# Patient Record
Sex: FEMALE | Race: WHITE | NOT HISPANIC OR LATINO | URBAN - METROPOLITAN AREA
[De-identification: names, ages, dates, MRNs, and addresses within clinical notes are randomized per-mention and may not be internally consistent; named-entity substitution may affect disease eponyms.]

---

## 2017-07-03 ENCOUNTER — OUTPATIENT (OUTPATIENT)
Dept: OUTPATIENT SERVICES | Facility: HOSPITAL | Age: 62
LOS: 1 days | Discharge: HOME | End: 2017-07-03

## 2017-07-03 DIAGNOSIS — Z01.818 ENCOUNTER FOR OTHER PREPROCEDURAL EXAMINATION: ICD-10-CM

## 2017-07-07 ENCOUNTER — OUTPATIENT (OUTPATIENT)
Dept: OUTPATIENT SERVICES | Facility: HOSPITAL | Age: 62
LOS: 1 days | Discharge: HOME | End: 2017-07-07

## 2017-07-07 DIAGNOSIS — M20.41 OTHER HAMMER TOE(S) (ACQUIRED), RIGHT FOOT: ICD-10-CM

## 2018-06-22 ENCOUNTER — EMERGENCY (EMERGENCY)
Facility: HOSPITAL | Age: 63
LOS: 0 days | Discharge: HOME | End: 2018-06-22
Attending: EMERGENCY MEDICINE | Admitting: EMERGENCY MEDICINE

## 2018-06-22 VITALS
HEART RATE: 72 BPM | TEMPERATURE: 97 F | RESPIRATION RATE: 19 BRPM | OXYGEN SATURATION: 99 % | DIASTOLIC BLOOD PRESSURE: 128 MMHG | SYSTOLIC BLOOD PRESSURE: 179 MMHG

## 2018-06-22 DIAGNOSIS — S92.901A UNSPECIFIED FRACTURE OF RIGHT FOOT, INITIAL ENCOUNTER FOR CLOSED FRACTURE: ICD-10-CM

## 2018-06-22 DIAGNOSIS — M79.671 PAIN IN RIGHT FOOT: ICD-10-CM
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
## 2018-06-22 NOTE — ED PROCEDURE NOTE - CPROC ED POST PROC CARE GUIDE1
Verbal/written post procedure instructions were given to patient/caregiver./Instructed patient/caregiver regarding signs and symptoms of infection./Instructed patient/caregiver to follow-up with primary care physician./Elevate the injured extremity as instructed.

## 2021-05-11 ENCOUNTER — APPOINTMENT (OUTPATIENT)
Dept: NEUROLOGY | Facility: CLINIC | Age: 66
End: 2021-05-11
Payer: COMMERCIAL

## 2021-05-11 VITALS
WEIGHT: 180 LBS | HEIGHT: 65 IN | SYSTOLIC BLOOD PRESSURE: 121 MMHG | BODY MASS INDEX: 29.99 KG/M2 | TEMPERATURE: 97.9 F | OXYGEN SATURATION: 97 % | HEART RATE: 54 BPM | DIASTOLIC BLOOD PRESSURE: 81 MMHG

## 2021-05-11 DIAGNOSIS — Z83.3 FAMILY HISTORY OF DIABETES MELLITUS: ICD-10-CM

## 2021-05-11 DIAGNOSIS — Z78.9 OTHER SPECIFIED HEALTH STATUS: ICD-10-CM

## 2021-05-11 DIAGNOSIS — D50.8 OTHER IRON DEFICIENCY ANEMIAS: ICD-10-CM

## 2021-05-11 DIAGNOSIS — R20.8 OTHER DISTURBANCES OF SKIN SENSATION: ICD-10-CM

## 2021-05-11 DIAGNOSIS — Z87.891 PERSONAL HISTORY OF NICOTINE DEPENDENCE: ICD-10-CM

## 2021-05-11 DIAGNOSIS — E03.9 HYPOTHYROIDISM, UNSPECIFIED: ICD-10-CM

## 2021-05-11 PROCEDURE — 99204 OFFICE O/P NEW MOD 45 MIN: CPT

## 2021-05-11 PROCEDURE — 99072 ADDL SUPL MATRL&STAF TM PHE: CPT

## 2021-05-11 RX ORDER — CHROMIUM 200 MCG
TABLET ORAL
Refills: 0 | Status: ACTIVE | COMMUNITY

## 2021-05-11 RX ORDER — ESOMEPRAZOLE MAGNESIUM 2.5 MG/1
2.5 GRANULE, DELAYED RELEASE ORAL
Refills: 0 | Status: ACTIVE | COMMUNITY

## 2021-05-11 RX ORDER — LEVOTHYROXINE SODIUM 125 UG/1
125 TABLET ORAL DAILY
Refills: 0 | Status: ACTIVE | COMMUNITY

## 2021-05-11 NOTE — ASSESSMENT
[FreeTextEntry1] : 1.  Left sciatica and abnormal lumbar spine MRI showing foraminal disease at L5-S1.   \par 2.  Bilateral vibration sensory loss in feet.\par \par Plan:\par 1.  EMG/NCS bilateral lower extremities.\par 2.  Neuropathy labs.\par 3.  No medications needed at this time.\par 4.  Return in 6 months.

## 2021-05-11 NOTE — REVIEW OF SYSTEMS
[Numbness] : numbness [Tingling] : tingling [Lightheadedness] : lightheadedness [Sleep Disturbances] : sleep disturbances [Heartburn] : heartburn [Joint Pain] : joint pain [Easy Bruising] : a tendency for easy bruising [Fever] : no fever [Chills] : no chills [Feeling Poorly] : not feeling poorly [Feeling Tired] : not feeling tired [Recent Weight Gain (___ Lbs)] : no recent weight gain [Recent Weight Loss (___ Lbs)] : no recent weight loss [Confused or Disoriented] : no confusion [Memory Lapses or Loss] : no memory loss [Decr. Concentrating Ability] : no decrease in concentrating ability [Facial Weakness] : no facial weakness [Arm Weakness] : no arm weakness [Hand Weakness] : no hand weakness [Leg Weakness] : no leg weakness [Seizures] : no convulsions [Dizziness] : no dizziness [Fainting] : no fainting [Vertigo] : no vertigo [Cluster Headache] : no cluster headache [Migraine Headache] : no migraine headache [Tension Headache] : no tension-type headache [Difficulty Walking] : no difficulty walking [Inability to Walk] : able to walk [Ataxia] : no ataxia [Anxiety] : no anxiety [Depression] : no depression [Eye Pain] : no eye pain [Eyesight Problems] : no eyesight problems [Earache] : no earache [Loss Of Hearing] : no hearing loss [Heart Rate Is Slow] : the heart rate was not slow [Heart Rate Is Fast] : the heart rate was not fast [Chest Pain] : no chest pain [Palpitations] : no palpitations [Shortness Of Breath] : no shortness of breath [Wheezing] : no wheezing [Cough] : no cough [Abdominal Pain] : no abdominal pain [Vomiting] : no vomiting [Constipation] : no constipation [Diarrhea] : no diarrhea [Melena] : no melena [Dysuria] : no dysuria [Incontinence] : no incontinence [Joint Swelling] : no joint swelling [Joint Stiffness] : no joint stiffness [Skin Lesions] : no skin lesions [Skin Wound] : no skin wound [Hot Flashes] : no hot flashes [Muscle Weakness] : no muscle weakness [Easy Bleeding] : no tendency for easy bleeding

## 2021-05-11 NOTE — PHYSICAL EXAM
[FreeTextEntry1] : Recent and remote memory, general fund of knowledge, attention, concentration, and language function were intact.  Pupils are equal, round and reactive to light and accommodation.  Funduscopic exam did not show any papilledema.  Visual fields are intact to confrontation.  Extraocular movements are full.  There is no nystagmus.  The facial muscles are symmetric.  Facial sensation is intact to light touch, temperature, and pinprick.  Hearing is intact to finger rub bilaterally.  Tongue is midline.  Palate elevates symmetrically.  Neck is supple.  There is no meningismus.  Shoulder shrugs are symmetric.  Motor exam demonstrates 5/5 strength in the proximal and distal muscles of the upper and lower extremities.  Tone and bulk were normal.  There is no pronator drift.  Reflexes are 2+ bilaterally in the biceps, triceps, brachioradialis, patellae and ankles.  Toes are downgoing.  There is no clonus.  Coordination demonstrates normal finger-to-nose, heel-to-shin and rapid alternating movements.  Gait demonstrates normal heel and toe walk.  Tandem gait is normal.  Sensory exam, normal light touch, pinprick, vibration sensation in upper extremities, decreased LT PP plantar aspect of left foot.  Also vibration 5 seconds in toes bilaterally.\par \par The patient is well-developed, well-nourished female in no acute distress.  Cardiac exam demonstrates a regular rate and rhythm.  No murmurs.  Carotids are 2+ bilaterally.  No bruits.  Abdomen is soft, nontender, and nondistended.  Bowel sounds are present.  Extremities showed no clubbing, cyanosis or edema. \par \par \par

## 2021-05-11 NOTE — HISTORY OF PRESENT ILLNESS
[FreeTextEntry1] : Pt is 65 yof c/o numbness in her legs.  Went to laser surgery for tiny veins in her legs. A week later experienced sharp pain behind her left knee then a month later had numbness and tingling in her left foot especially when she lays down.  Saw another neurologist who ran MRI of her lumbar spine.  Sx's present now for over a year, tingling for 6-7 months.  Doesn't affect her balance.  Mostly bothers her when laying down.  No buckling of her legs.  If props feet up can feel the numbness.   No sx's in right leg.  Did not have EMG/NCS.

## 2021-07-07 ENCOUNTER — APPOINTMENT (OUTPATIENT)
Dept: NEUROLOGY | Facility: CLINIC | Age: 66
End: 2021-07-07
Payer: COMMERCIAL

## 2021-07-07 DIAGNOSIS — M54.2 CERVICALGIA: ICD-10-CM

## 2021-07-07 PROCEDURE — 95910 NRV CNDJ TEST 7-8 STUDIES: CPT

## 2021-07-07 PROCEDURE — 95886 MUSC TEST DONE W/N TEST COMP: CPT

## 2021-07-07 PROCEDURE — 99072 ADDL SUPL MATRL&STAF TM PHE: CPT

## 2021-10-26 ENCOUNTER — TRANSCRIPTION ENCOUNTER (OUTPATIENT)
Age: 66
End: 2021-10-26

## 2022-01-27 ENCOUNTER — APPOINTMENT (OUTPATIENT)
Dept: NEUROLOGY | Facility: CLINIC | Age: 67
End: 2022-01-27

## 2022-07-24 ENCOUNTER — LABORATORY RESULT (OUTPATIENT)
Age: 67
End: 2022-07-24

## 2022-07-25 ENCOUNTER — APPOINTMENT (OUTPATIENT)
Dept: UROLOGY | Facility: CLINIC | Age: 67
End: 2022-07-25

## 2022-07-25 VITALS — WEIGHT: 190 LBS | BODY MASS INDEX: 34.96 KG/M2 | HEIGHT: 62 IN

## 2022-07-25 DIAGNOSIS — G57.93 UNSPECIFIED MONONEUROPATHY OF BILATERAL LOWER LIMBS: ICD-10-CM

## 2022-07-25 LAB
BILIRUB UR QL STRIP: NORMAL
COLLECTION METHOD: NORMAL
GLUCOSE UR-MCNC: NORMAL
HCG UR QL: 0.2 EU/DL
HGB UR QL STRIP.AUTO: NORMAL
KETONES UR-MCNC: NORMAL
LEUKOCYTE ESTERASE UR QL STRIP: NORMAL
NITRITE UR QL STRIP: NORMAL
PH UR STRIP: 5.5
PROT UR STRIP-MCNC: NORMAL
SP GR UR STRIP: 1.02

## 2022-07-25 PROCEDURE — 99204 OFFICE O/P NEW MOD 45 MIN: CPT

## 2022-07-25 PROCEDURE — 81002 URINALYSIS NONAUTO W/O SCOPE: CPT

## 2022-07-25 RX ORDER — ESCITALOPRAM OXALATE 10 MG/1
10 TABLET ORAL
Qty: 90 | Refills: 0 | Status: ACTIVE | COMMUNITY
Start: 2021-11-11

## 2022-07-25 RX ORDER — NITROFURANTOIN (MONOHYDRATE/MACROCRYSTALS) 25; 75 MG/1; MG/1
100 CAPSULE ORAL
Qty: 14 | Refills: 0 | Status: ACTIVE | COMMUNITY
Start: 2022-07-05

## 2022-07-25 RX ORDER — DENOSUMAB 60 MG/ML
60 INJECTION SUBCUTANEOUS
Qty: 1 | Refills: 0 | Status: ACTIVE | COMMUNITY
Start: 2022-06-15

## 2022-07-25 RX ORDER — ERGOCALCIFEROL 1.25 MG/1
1.25 MG CAPSULE, LIQUID FILLED ORAL
Qty: 12 | Refills: 0 | Status: ACTIVE | COMMUNITY
Start: 2022-04-23

## 2022-07-25 NOTE — ASSESSMENT
[FreeTextEntry1] : Ms. Gasca is a 66 year old female with a possible left renal stone seen on KUB. We will have her get a CT scan without contrast to verify this is a stone and follow up with her after this. We discussed that if the CT scan does not show a stone, then she will need to be set up for a cystoscopy to rule out any tumors or stones in the bladder. All of her questions were otherwise answered. \par \par Patient was seen and examined with nurse practitioner Tara Stuart who also acted as a scribe for this report.  I agree with the note above.  A long discussion with the patient about proceeding with a CT first to see if there is a large stone present or not.  If there is no stones and certainly she needs a cystoscopy to complete the microhematuria work-up.  If there is a stone then we will decide on further intervention for the stone.  All her questions were appropriately answered.  While she has some flank discomfort she does not have true renal colic and thus it is difficult to know because she also has some back issues etc.

## 2022-07-25 NOTE — PHYSICAL EXAM
[Normal Appearance] : normal appearance [General Appearance - In No Acute Distress] : no acute distress [Edema] : no peripheral edema [] : no respiratory distress [Abdomen Soft] : soft [Abdomen Tenderness] : non-tender [Normal Station and Gait] : the gait and station were normal for the patient's age [Oriented To Time, Place, And Person] : oriented to person, place, and time [Affect] : the affect was normal

## 2022-07-25 NOTE — HISTORY OF PRESENT ILLNESS
[Currently Experiencing ___] :  [unfilled] [Hematuria - Microscopic] : microscopic hematuria [FreeTextEntry1] : Ms. Gasca is a 66 year old female with a history of gastric sleeve done 25 years ago and kidney stones in the past who presents for microhematuria found by her GYN. She did have an episode of left flank pain and urinary frequency and urgency that has since resolved. Pt had a KUB done 7/22 that shows a 2.8x1.4cm ovoid calcific density suspecting a renal calculus. Pt currently denies any flank pain, gross hematuria, urinary frequency, urgency. \par \par

## 2022-08-22 ENCOUNTER — APPOINTMENT (OUTPATIENT)
Dept: UROLOGY | Facility: CLINIC | Age: 67
End: 2022-08-22

## 2022-08-22 VITALS
WEIGHT: 187 LBS | SYSTOLIC BLOOD PRESSURE: 129 MMHG | HEIGHT: 62 IN | DIASTOLIC BLOOD PRESSURE: 82 MMHG | HEART RATE: 46 BPM | BODY MASS INDEX: 34.41 KG/M2

## 2022-08-22 PROCEDURE — 99214 OFFICE O/P EST MOD 30 MIN: CPT

## 2022-08-22 NOTE — PHYSICAL EXAM
[Normal Appearance] : normal appearance [General Appearance - In No Acute Distress] : no acute distress [Oriented To Time, Place, And Person] : oriented to person, place, and time [Affect] : the affect was normal

## 2022-08-22 NOTE — ASSESSMENT
[FreeTextEntry1] : 67 y/o female with left partial staghorn calculus. We discussed options in detail including left PCNL. I suggested that this would be the best overall procedure to get rid of the stone in one sitting, but she has had many ureteroscopies and would prefer that with laser lithotripsy rather than proceeding with PCNL. We have discussed the pros and cons and all her questions have been answered. As she prefers several procedures that is not PCNL, we will start with laser lithotripsy and ureteroscopy and go from there with sandwich therapy.  \par tOTAL TIME SPENT DISCUSSING THE VARIOUS PROCEDURES, RISKS AND BENEFITS WAS 30 MIN.

## 2022-08-22 NOTE — HISTORY OF PRESENT ILLNESS
[FreeTextEntry1] : 67 y/o female present a history of stones and hematuria. She had a CT which confirmed that she has a branched calculus which was 2.4cm by 1.3cm by 1.2cm in size. This is on the left side and I have reviewed the CT in its entirety. There is no hydronephrosis or hydroureter. The patient's previous stones have been calcium oxalate. We went over the CT and have discussed this. She currently has no pain or discomfort. \par \par allergies: none known.

## 2022-09-13 ENCOUNTER — OUTPATIENT (OUTPATIENT)
Dept: OUTPATIENT SERVICES | Facility: HOSPITAL | Age: 67
LOS: 1 days | Discharge: HOME | End: 2022-09-13

## 2022-09-13 ENCOUNTER — RESULT REVIEW (OUTPATIENT)
Age: 67
End: 2022-09-13

## 2022-09-13 VITALS
DIASTOLIC BLOOD PRESSURE: 70 MMHG | TEMPERATURE: 97 F | HEART RATE: 58 BPM | OXYGEN SATURATION: 96 % | WEIGHT: 188.27 LBS | HEIGHT: 63 IN | SYSTOLIC BLOOD PRESSURE: 151 MMHG | RESPIRATION RATE: 18 BRPM

## 2022-09-13 DIAGNOSIS — Z98.84 BARIATRIC SURGERY STATUS: Chronic | ICD-10-CM

## 2022-09-13 DIAGNOSIS — Z01.818 ENCOUNTER FOR OTHER PREPROCEDURAL EXAMINATION: ICD-10-CM

## 2022-09-13 DIAGNOSIS — N20.2 CALCULUS OF KIDNEY WITH CALCULUS OF URETER: ICD-10-CM

## 2022-09-13 DIAGNOSIS — E89.0 POSTPROCEDURAL HYPOTHYROIDISM: Chronic | ICD-10-CM

## 2022-09-13 LAB
ALBUMIN SERPL ELPH-MCNC: 4.6 G/DL — SIGNIFICANT CHANGE UP (ref 3.5–5.2)
ALP SERPL-CCNC: 69 U/L — SIGNIFICANT CHANGE UP (ref 30–115)
ALT FLD-CCNC: 25 U/L — SIGNIFICANT CHANGE UP (ref 0–41)
ANION GAP SERPL CALC-SCNC: 11 MMOL/L — SIGNIFICANT CHANGE UP (ref 7–14)
APTT BLD: 26.5 SEC — LOW (ref 27–39.2)
AST SERPL-CCNC: 25 U/L — SIGNIFICANT CHANGE UP (ref 0–41)
BASOPHILS # BLD AUTO: 0.02 K/UL — SIGNIFICANT CHANGE UP (ref 0–0.2)
BASOPHILS NFR BLD AUTO: 0.4 % — SIGNIFICANT CHANGE UP (ref 0–1)
BILIRUB SERPL-MCNC: 0.4 MG/DL — SIGNIFICANT CHANGE UP (ref 0.2–1.2)
BUN SERPL-MCNC: 20 MG/DL — SIGNIFICANT CHANGE UP (ref 10–20)
CALCIUM SERPL-MCNC: 9.6 MG/DL — SIGNIFICANT CHANGE UP (ref 8.4–10.5)
CHLORIDE SERPL-SCNC: 103 MMOL/L — SIGNIFICANT CHANGE UP (ref 98–110)
CO2 SERPL-SCNC: 24 MMOL/L — SIGNIFICANT CHANGE UP (ref 17–32)
CREAT SERPL-MCNC: 0.7 MG/DL — SIGNIFICANT CHANGE UP (ref 0.7–1.5)
EGFR: 96 ML/MIN/1.73M2 — SIGNIFICANT CHANGE UP
EOSINOPHIL # BLD AUTO: 0.11 K/UL — SIGNIFICANT CHANGE UP (ref 0–0.7)
EOSINOPHIL NFR BLD AUTO: 2.1 % — SIGNIFICANT CHANGE UP (ref 0–8)
GLUCOSE SERPL-MCNC: 80 MG/DL — SIGNIFICANT CHANGE UP (ref 70–99)
HCT VFR BLD CALC: 41.3 % — SIGNIFICANT CHANGE UP (ref 37–47)
HGB BLD-MCNC: 13 G/DL — SIGNIFICANT CHANGE UP (ref 12–16)
IMM GRANULOCYTES NFR BLD AUTO: 0.2 % — SIGNIFICANT CHANGE UP (ref 0.1–0.3)
INR BLD: 0.92 RATIO — SIGNIFICANT CHANGE UP (ref 0.65–1.3)
LYMPHOCYTES # BLD AUTO: 1.79 K/UL — SIGNIFICANT CHANGE UP (ref 1.2–3.4)
LYMPHOCYTES # BLD AUTO: 34.6 % — SIGNIFICANT CHANGE UP (ref 20.5–51.1)
MCHC RBC-ENTMCNC: 29.5 PG — SIGNIFICANT CHANGE UP (ref 27–31)
MCHC RBC-ENTMCNC: 31.5 G/DL — LOW (ref 32–37)
MCV RBC AUTO: 93.9 FL — SIGNIFICANT CHANGE UP (ref 81–99)
MONOCYTES # BLD AUTO: 0.41 K/UL — SIGNIFICANT CHANGE UP (ref 0.1–0.6)
MONOCYTES NFR BLD AUTO: 7.9 % — SIGNIFICANT CHANGE UP (ref 1.7–9.3)
NEUTROPHILS # BLD AUTO: 2.84 K/UL — SIGNIFICANT CHANGE UP (ref 1.4–6.5)
NEUTROPHILS NFR BLD AUTO: 54.8 % — SIGNIFICANT CHANGE UP (ref 42.2–75.2)
NRBC # BLD: 0 /100 WBCS — SIGNIFICANT CHANGE UP (ref 0–0)
PLATELET # BLD AUTO: 247 K/UL — SIGNIFICANT CHANGE UP (ref 130–400)
POTASSIUM SERPL-MCNC: 4.5 MMOL/L — SIGNIFICANT CHANGE UP (ref 3.5–5)
POTASSIUM SERPL-SCNC: 4.5 MMOL/L — SIGNIFICANT CHANGE UP (ref 3.5–5)
PROT SERPL-MCNC: 7.3 G/DL — SIGNIFICANT CHANGE UP (ref 6–8)
PROTHROM AB SERPL-ACNC: 10.6 SEC — SIGNIFICANT CHANGE UP (ref 9.95–12.87)
RBC # BLD: 4.4 M/UL — SIGNIFICANT CHANGE UP (ref 4.2–5.4)
RBC # FLD: 13.3 % — SIGNIFICANT CHANGE UP (ref 11.5–14.5)
SODIUM SERPL-SCNC: 138 MMOL/L — SIGNIFICANT CHANGE UP (ref 135–146)
WBC # BLD: 5.18 K/UL — SIGNIFICANT CHANGE UP (ref 4.8–10.8)
WBC # FLD AUTO: 5.18 K/UL — SIGNIFICANT CHANGE UP (ref 4.8–10.8)

## 2022-09-13 PROCEDURE — 93010 ELECTROCARDIOGRAM REPORT: CPT

## 2022-09-13 PROCEDURE — 71046 X-RAY EXAM CHEST 2 VIEWS: CPT | Mod: 26

## 2022-09-13 NOTE — H&P PST ADULT - NSICDXPASTMEDICALHX_GEN_ALL_CORE_FT
PAST MEDICAL HISTORY:  Anemia last iron infusion 8/2022    Former smoker     GERD (gastroesophageal reflux disease)

## 2022-09-13 NOTE — H&P PST ADULT - AIRWAY
Mildly to Moderately Impaired: difficulty hearing in some environments or speaker may need to increase volume or speak distinctly
normal

## 2022-09-13 NOTE — H&P PST ADULT - REASON FOR ADMISSION
67 Y/O F SCHEDULED FOR PAST FOR  CYSTOSCOPY LEFT URETEROSCOPY LASER LITHOTRIPSY URETERAL STENT PLACEMENT UNDER GA WITH DR GUAJARDO ON 9/28/22. PT REPORTS SHE EXPERIENCED HEMATURIA AND ABDOMINAL PAIN IN AUGUST. WORKUP REVEALED LEFT KIDNEY STONE.

## 2022-09-13 NOTE — H&P PST ADULT - HISTORY OF PRESENT ILLNESS
CURRENTLY  DENIES ANY CP, SOB, PALPITATIONS, COUGH OR DYSURIA  EXERCISE TOLERANCE 2 FOS WITHOUT SOB    AS PER PATIENT  this is his/her complete medical history including medications - PRESCRIPTIONS  OVER THE COUNTER MEDS    pt denies any covid s/s, or tested positive in the past.  Received covid vaccine  pt advised self quarantine till day of procedure    Anesthesia Alert  NO--Difficult Airway  YES--History of neck surgery or radiation- PARTIAL THYROIDECTOMY  NO--Limited ROM of neck  NO--History of Malignant hyperthermia  NO--No personal or family history of Pseudocholinesterase deficiency.  NO--Prior Anesthesia Complication  NO--Latex Allergy  NO--Loose teeth  NO--History of Rheumatoid Arthritis  NO--ALBARO  NO--Bleeding risk  NO--Other_____    Patient verbalized understanding of instructions and was given the opportunity to ask questions and have them answered.

## 2022-09-14 LAB
APPEARANCE UR: CLEAR — SIGNIFICANT CHANGE UP
BACTERIA # UR AUTO: NEGATIVE — SIGNIFICANT CHANGE UP
BILIRUB UR-MCNC: NEGATIVE — SIGNIFICANT CHANGE UP
COLOR SPEC: SIGNIFICANT CHANGE UP
DIFF PNL FLD: ABNORMAL
EPI CELLS # UR: 1 /HPF — SIGNIFICANT CHANGE UP (ref 0–5)
GLUCOSE UR QL: SIGNIFICANT CHANGE UP
HYALINE CASTS # UR AUTO: 0 /LPF — SIGNIFICANT CHANGE UP (ref 0–7)
KETONES UR-MCNC: NEGATIVE — SIGNIFICANT CHANGE UP
LEUKOCYTE ESTERASE UR-ACNC: ABNORMAL
NITRITE UR-MCNC: NEGATIVE — SIGNIFICANT CHANGE UP
PH UR: 6 — SIGNIFICANT CHANGE UP (ref 5–8)
PROT UR-MCNC: NEGATIVE — SIGNIFICANT CHANGE UP
RBC CASTS # UR COMP ASSIST: 2 /HPF — SIGNIFICANT CHANGE UP (ref 0–4)
SP GR SPEC: 1.02 — SIGNIFICANT CHANGE UP (ref 1.01–1.02)
UROBILINOGEN FLD QL: SIGNIFICANT CHANGE UP
WBC UR QL: 6 /HPF — HIGH (ref 0–5)

## 2022-09-15 ENCOUNTER — APPOINTMENT (OUTPATIENT)
Dept: UROLOGY | Facility: CLINIC | Age: 67
End: 2022-09-15

## 2022-09-15 LAB
CULTURE RESULTS: SIGNIFICANT CHANGE UP
SPECIMEN SOURCE: SIGNIFICANT CHANGE UP

## 2022-09-16 ENCOUNTER — NON-APPOINTMENT (OUTPATIENT)
Age: 67
End: 2022-09-16

## 2022-09-25 ENCOUNTER — LABORATORY RESULT (OUTPATIENT)
Age: 67
End: 2022-09-25

## 2022-09-27 ENCOUNTER — NON-APPOINTMENT (OUTPATIENT)
Age: 67
End: 2022-09-27

## 2022-09-28 ENCOUNTER — APPOINTMENT (OUTPATIENT)
Dept: UROLOGY | Facility: HOSPITAL | Age: 67
End: 2022-09-28

## 2022-09-28 ENCOUNTER — TRANSCRIPTION ENCOUNTER (OUTPATIENT)
Age: 67
End: 2022-09-28

## 2022-09-28 ENCOUNTER — OUTPATIENT (OUTPATIENT)
Dept: OUTPATIENT SERVICES | Facility: HOSPITAL | Age: 67
LOS: 1 days | Discharge: HOME | End: 2022-09-28

## 2022-09-28 VITALS
OXYGEN SATURATION: 98 % | TEMPERATURE: 97 F | SYSTOLIC BLOOD PRESSURE: 128 MMHG | HEIGHT: 63 IN | WEIGHT: 188.05 LBS | RESPIRATION RATE: 17 BRPM | DIASTOLIC BLOOD PRESSURE: 65 MMHG | HEART RATE: 64 BPM

## 2022-09-28 VITALS — SYSTOLIC BLOOD PRESSURE: 119 MMHG | HEART RATE: 55 BPM | RESPIRATION RATE: 15 BRPM | DIASTOLIC BLOOD PRESSURE: 61 MMHG

## 2022-09-28 DIAGNOSIS — E89.0 POSTPROCEDURAL HYPOTHYROIDISM: Chronic | ICD-10-CM

## 2022-09-28 DIAGNOSIS — Z98.84 BARIATRIC SURGERY STATUS: Chronic | ICD-10-CM

## 2022-09-28 PROCEDURE — 52356 CYSTO/URETERO W/LITHOTRIPSY: CPT | Mod: LT

## 2022-09-28 RX ORDER — KETOROLAC TROMETHAMINE 30 MG/ML
30 SYRINGE (ML) INJECTION ONCE
Refills: 0 | Status: DISCONTINUED | OUTPATIENT
Start: 2022-09-28 | End: 2022-09-28

## 2022-09-28 RX ORDER — SODIUM CHLORIDE 9 MG/ML
1000 INJECTION, SOLUTION INTRAVENOUS
Refills: 0 | Status: DISCONTINUED | OUTPATIENT
Start: 2022-09-28 | End: 2022-09-28

## 2022-09-28 RX ORDER — HYDROMORPHONE HYDROCHLORIDE 2 MG/ML
0.5 INJECTION INTRAMUSCULAR; INTRAVENOUS; SUBCUTANEOUS
Refills: 0 | Status: DISCONTINUED | OUTPATIENT
Start: 2022-09-28 | End: 2022-09-28

## 2022-09-28 RX ORDER — PHENAZOPYRIDINE HCL 100 MG
200 TABLET ORAL ONCE
Refills: 0 | Status: COMPLETED | OUTPATIENT
Start: 2022-09-28 | End: 2022-09-28

## 2022-09-28 RX ADMIN — HYDROMORPHONE HYDROCHLORIDE 0.5 MILLIGRAM(S): 2 INJECTION INTRAMUSCULAR; INTRAVENOUS; SUBCUTANEOUS at 10:32

## 2022-09-28 RX ADMIN — HYDROMORPHONE HYDROCHLORIDE 0.5 MILLIGRAM(S): 2 INJECTION INTRAMUSCULAR; INTRAVENOUS; SUBCUTANEOUS at 10:18

## 2022-09-28 RX ADMIN — Medication 30 MILLIGRAM(S): at 10:37

## 2022-09-28 RX ADMIN — Medication 30 MILLIGRAM(S): at 10:46

## 2022-09-28 RX ADMIN — Medication 200 MILLIGRAM(S): at 10:37

## 2022-09-28 RX ADMIN — HYDROMORPHONE HYDROCHLORIDE 0.5 MILLIGRAM(S): 2 INJECTION INTRAMUSCULAR; INTRAVENOUS; SUBCUTANEOUS at 09:29

## 2022-09-28 RX ADMIN — SODIUM CHLORIDE 75 MILLILITER(S): 9 INJECTION, SOLUTION INTRAVENOUS at 09:24

## 2022-09-28 RX ADMIN — HYDROMORPHONE HYDROCHLORIDE 0.5 MILLIGRAM(S): 2 INJECTION INTRAMUSCULAR; INTRAVENOUS; SUBCUTANEOUS at 09:42

## 2022-09-28 NOTE — ASU PATIENT PROFILE, ADULT - FALL HARM RISK - UNIVERSAL INTERVENTIONS
Bed in lowest position, wheels locked, appropriate side rails in place/Call bell, personal items and telephone in reach/Instruct patient to call for assistance before getting out of bed or chair/Non-slip footwear when patient is out of bed/[place] to call system/Physically safe environment - no spills, clutter or unnecessary equipment/Purposeful Proactive Rounding/Room/bathroom lighting operational, light cord in reach

## 2022-09-28 NOTE — ASU DISCHARGE PLAN (ADULT/PEDIATRIC) - FOLLOW UP APPOINTMENTS
[facility]:  Ambulatory Surgery [facility]

## 2022-09-28 NOTE — BRIEF OPERATIVE NOTE - NSICDXBRIEFPROCEDURE_GEN_ALL_CORE_FT
PROCEDURES:  Ureteroscopy with laser lithotripsy and stent placement 28-Sep-2022 08:57:56  Nikolas Lopez

## 2022-09-28 NOTE — ASU DISCHARGE PLAN (ADULT/PEDIATRIC) - NS MD DC FALL RISK RISK
For information on Fall & Injury Prevention, visit: [email or web address] OR  [email or web address] OR  https://www.cdc.gov/steadi/patient.html

## 2022-09-29 ENCOUNTER — NON-APPOINTMENT (OUTPATIENT)
Age: 67
End: 2022-09-29

## 2022-09-30 ENCOUNTER — NON-APPOINTMENT (OUTPATIENT)
Age: 67
End: 2022-09-30

## 2022-09-30 DIAGNOSIS — N20.0 CALCULUS OF KIDNEY: ICD-10-CM

## 2022-09-30 DIAGNOSIS — G47.33 OBSTRUCTIVE SLEEP APNEA (ADULT) (PEDIATRIC): ICD-10-CM

## 2022-10-04 ENCOUNTER — NON-APPOINTMENT (OUTPATIENT)
Age: 67
End: 2022-10-04

## 2022-10-04 PROBLEM — Z87.891 PERSONAL HISTORY OF NICOTINE DEPENDENCE: Chronic | Status: ACTIVE | Noted: 2022-09-13

## 2022-10-04 PROBLEM — K21.9 GASTRO-ESOPHAGEAL REFLUX DISEASE WITHOUT ESOPHAGITIS: Chronic | Status: ACTIVE | Noted: 2022-09-13

## 2022-10-04 PROBLEM — D64.9 ANEMIA, UNSPECIFIED: Chronic | Status: ACTIVE | Noted: 2022-09-13

## 2022-10-17 ENCOUNTER — LABORATORY RESULT (OUTPATIENT)
Age: 67
End: 2022-10-17

## 2022-10-17 RX ORDER — PHENAZOPYRIDINE HYDROCHLORIDE 100 MG/1
100 TABLET ORAL 3 TIMES DAILY
Qty: 9 | Refills: 0 | Status: ACTIVE | COMMUNITY
Start: 2022-10-17 | End: 1900-01-01

## 2022-10-18 ENCOUNTER — NON-APPOINTMENT (OUTPATIENT)
Age: 67
End: 2022-10-18

## 2022-10-18 ENCOUNTER — OUTPATIENT (OUTPATIENT)
Dept: OUTPATIENT SERVICES | Facility: HOSPITAL | Age: 67
LOS: 1 days | Discharge: HOME | End: 2022-10-18

## 2022-10-18 VITALS
HEIGHT: 61 IN | TEMPERATURE: 99 F | WEIGHT: 183.42 LBS | DIASTOLIC BLOOD PRESSURE: 62 MMHG | SYSTOLIC BLOOD PRESSURE: 130 MMHG | RESPIRATION RATE: 15 BRPM | OXYGEN SATURATION: 100 % | HEART RATE: 74 BPM

## 2022-10-18 DIAGNOSIS — Z98.84 BARIATRIC SURGERY STATUS: Chronic | ICD-10-CM

## 2022-10-18 DIAGNOSIS — N20.0 CALCULUS OF KIDNEY: ICD-10-CM

## 2022-10-18 DIAGNOSIS — Z01.818 ENCOUNTER FOR OTHER PREPROCEDURAL EXAMINATION: ICD-10-CM

## 2022-10-18 DIAGNOSIS — E89.0 POSTPROCEDURAL HYPOTHYROIDISM: Chronic | ICD-10-CM

## 2022-10-18 LAB
ALBUMIN SERPL ELPH-MCNC: 4.2 G/DL — SIGNIFICANT CHANGE UP (ref 3.5–5.2)
ALP SERPL-CCNC: 68 U/L — SIGNIFICANT CHANGE UP (ref 30–115)
ALT FLD-CCNC: 21 U/L — SIGNIFICANT CHANGE UP (ref 0–41)
ANION GAP SERPL CALC-SCNC: 10 MMOL/L — SIGNIFICANT CHANGE UP (ref 7–14)
APPEARANCE UR: ABNORMAL
APPEARANCE: ABNORMAL
APTT BLD: 28 SEC — SIGNIFICANT CHANGE UP (ref 27–39.2)
AST SERPL-CCNC: 22 U/L — SIGNIFICANT CHANGE UP (ref 0–41)
BACTERIA # UR AUTO: NEGATIVE — SIGNIFICANT CHANGE UP
BASOPHILS # BLD AUTO: 0.04 K/UL — SIGNIFICANT CHANGE UP (ref 0–0.2)
BASOPHILS NFR BLD AUTO: 0.8 % — SIGNIFICANT CHANGE UP (ref 0–1)
BILIRUB SERPL-MCNC: 0.3 MG/DL — SIGNIFICANT CHANGE UP (ref 0.2–1.2)
BILIRUB UR-MCNC: ABNORMAL
BILIRUBIN URINE: NEGATIVE
BLOOD URINE: ABNORMAL
BUN SERPL-MCNC: 17 MG/DL — SIGNIFICANT CHANGE UP (ref 10–20)
CALCIUM SERPL-MCNC: 8.7 MG/DL — SIGNIFICANT CHANGE UP (ref 8.4–10.5)
CHLORIDE SERPL-SCNC: 110 MMOL/L — SIGNIFICANT CHANGE UP (ref 98–110)
CO2 SERPL-SCNC: 23 MMOL/L — SIGNIFICANT CHANGE UP (ref 17–32)
COLOR SPEC: ABNORMAL
COLOR: YELLOW
CREAT SERPL-MCNC: 0.7 MG/DL — SIGNIFICANT CHANGE UP (ref 0.7–1.5)
DIFF PNL FLD: ABNORMAL
EGFR: 95 ML/MIN/1.73M2 — SIGNIFICANT CHANGE UP
EOSINOPHIL # BLD AUTO: 0.28 K/UL — SIGNIFICANT CHANGE UP (ref 0–0.7)
EOSINOPHIL NFR BLD AUTO: 5.6 % — SIGNIFICANT CHANGE UP (ref 0–8)
EPI CELLS # UR: 2 /HPF — SIGNIFICANT CHANGE UP (ref 0–5)
GLUCOSE QUALITATIVE U: NEGATIVE
GLUCOSE SERPL-MCNC: 92 MG/DL — SIGNIFICANT CHANGE UP (ref 70–99)
GLUCOSE UR QL: SIGNIFICANT CHANGE UP
HCT VFR BLD CALC: 38.4 % — SIGNIFICANT CHANGE UP (ref 37–47)
HGB BLD-MCNC: 12.2 G/DL — SIGNIFICANT CHANGE UP (ref 12–16)
HYALINE CASTS # UR AUTO: 2 /LPF — SIGNIFICANT CHANGE UP (ref 0–7)
IMM GRANULOCYTES NFR BLD AUTO: 0.2 % — SIGNIFICANT CHANGE UP (ref 0.1–0.3)
INR BLD: 0.94 RATIO — SIGNIFICANT CHANGE UP (ref 0.65–1.3)
KETONES UR-MCNC: NEGATIVE — SIGNIFICANT CHANGE UP
KETONES URINE: NEGATIVE
LEUKOCYTE ESTERASE UR-ACNC: ABNORMAL
LEUKOCYTE ESTERASE URINE: ABNORMAL
LYMPHOCYTES # BLD AUTO: 1.57 K/UL — SIGNIFICANT CHANGE UP (ref 1.2–3.4)
LYMPHOCYTES # BLD AUTO: 31.3 % — SIGNIFICANT CHANGE UP (ref 20.5–51.1)
MCHC RBC-ENTMCNC: 29.8 PG — SIGNIFICANT CHANGE UP (ref 27–31)
MCHC RBC-ENTMCNC: 31.8 G/DL — LOW (ref 32–37)
MCV RBC AUTO: 93.7 FL — SIGNIFICANT CHANGE UP (ref 81–99)
MONOCYTES # BLD AUTO: 0.41 K/UL — SIGNIFICANT CHANGE UP (ref 0.1–0.6)
MONOCYTES NFR BLD AUTO: 8.2 % — SIGNIFICANT CHANGE UP (ref 1.7–9.3)
NEUTROPHILS # BLD AUTO: 2.71 K/UL — SIGNIFICANT CHANGE UP (ref 1.4–6.5)
NEUTROPHILS NFR BLD AUTO: 53.9 % — SIGNIFICANT CHANGE UP (ref 42.2–75.2)
NITRITE UR-MCNC: POSITIVE
NITRITE URINE: NEGATIVE
NRBC # BLD: 0 /100 WBCS — SIGNIFICANT CHANGE UP (ref 0–0)
PH UR: 6 — SIGNIFICANT CHANGE UP (ref 5–8)
PH URINE: 6
PLATELET # BLD AUTO: 271 K/UL — SIGNIFICANT CHANGE UP (ref 130–400)
POTASSIUM SERPL-MCNC: 5.4 MMOL/L — HIGH (ref 3.5–5)
POTASSIUM SERPL-SCNC: 5.4 MMOL/L — HIGH (ref 3.5–5)
PROT SERPL-MCNC: 6.2 G/DL — SIGNIFICANT CHANGE UP (ref 6–8)
PROT UR-MCNC: ABNORMAL
PROTEIN URINE: ABNORMAL
PROTHROM AB SERPL-ACNC: 10.7 SEC — SIGNIFICANT CHANGE UP (ref 9.95–12.87)
RBC # BLD: 4.1 M/UL — LOW (ref 4.2–5.4)
RBC # FLD: 12.1 % — SIGNIFICANT CHANGE UP (ref 11.5–14.5)
RBC CASTS # UR COMP ASSIST: 4 /HPF — SIGNIFICANT CHANGE UP (ref 0–4)
SODIUM SERPL-SCNC: 143 MMOL/L — SIGNIFICANT CHANGE UP (ref 135–146)
SP GR SPEC: 1.01 — SIGNIFICANT CHANGE UP (ref 1.01–1.03)
SPECIFIC GRAVITY URINE: 1.01
UROBILINOGEN FLD QL: ABNORMAL
UROBILINOGEN URINE: NORMAL
WBC # BLD: 5.02 K/UL — SIGNIFICANT CHANGE UP (ref 4.8–10.8)
WBC # FLD AUTO: 5.02 K/UL — SIGNIFICANT CHANGE UP (ref 4.8–10.8)
WBC UR QL: 31 /HPF — HIGH (ref 0–5)

## 2022-10-18 PROCEDURE — 93010 ELECTROCARDIOGRAM REPORT: CPT

## 2022-10-18 NOTE — H&P PST ADULT - HISTORY OF PRESENT ILLNESS
PT PRESENTS TO PAST WITH NO SOB, CP, PALPITATIONS, DYSURIA, UTI OR URI AT PRESENT.   PT ABLE TO WALK UP 2-3 FLIGHTS OF STEPS WITH NO SOB.  AS PER THE PT, THIS IS HIS/HER COMPLETE MEDICAL AND SURGICAL HX, INCLUDING MEDICATIONS PRESCRIBED AND OVER THE COUNTER  pt denies any covid s/s, or tested positive in the past--PT IS AWARE OF DATE AND TIME OF COVID TESTING PRIOR TO DOP  pt advised self quarantine till day of procedure  denies travel outside the [place] in the past 30 days  Anesthesia Alert  NO--Difficult Airway  NO--History of neck surgery or radiation  NO--Limited ROM of neck  NO--History of Malignant hyperthermia  NO--Personal or family history of Pseudocholinesterase deficiency  NO--Prior Anesthesia Complication  NO--Latex Allergy  NO--Loose teeth  NO--History of Rheumatoid Arthritis  NO--ALBARO  NO BLEEDING RISK  NO--Other_____

## 2022-10-18 NOTE — H&P PST ADULT - NSICDXPASTMEDICALHX_GEN_ALL_CORE_FT
PAST MEDICAL HISTORY:  Anemia last iron infusion 8/2022    Former smoker     GERD (gastroesophageal reflux disease)     H/O: obesity bmi-34.7%    Hypothyroidism as per pt- i had all my thyroid levels drawn today 10/18/22 at my [facility] office

## 2022-10-18 NOTE — H&P PST ADULT - REASON FOR ADMISSION
Proceduralist: Nikolas Lopez  Procedure: LEFT EXTRACORPOREAL SHOCK WAVE LITHOTRIPSY  Procedure: 60 Minutes  Anesthesia Type: General  PT REPORTS-- I HAVE A H/O KIDNEY STONES.  I HAVE A STENT PLACED BECAUSE THEY ARE  UNABLE TO BREAK UP MY STONES.     PT REPORTS- I HAVE PAIN IN MY LOWER ABDOMIN. I HAVE HAD THE PAIN FOR A  2  MONTHS ON AND OFF.     THE PAIN IS 5/10.  IT IS A BURNING AND CRAMPING  TYPE OF PAIN.  NOTHING HELPS TAKE AWAY THE PAIN. Proceduralist: Nikolas Lopez  Procedure: LEFT EXTRACORPOREAL SHOCK WAVE LITHOTRIPSY  Procedure: 60 Minutes  Anesthesia Type: General  PT REPORTS-- I HAVE A H/O KIDNEY STONES.  I HAVE A STENT PLACED BECAUSE THEY ARE  UNABLE TO BREAK UP MY STONES.     PT REPORTS- I HAVE PAIN IN MY LOWER ABDOMIN. I HAVE HAD THE PAIN FOR ABOUT   2  MONTHS ON AND OFF.     THE PAIN IS 5/10.  IT IS A BURNING AND CRAMPING  TYPE OF PAIN.  NOTHING HELPS TAKE AWAY THE PAIN.

## 2022-10-19 ENCOUNTER — NON-APPOINTMENT (OUTPATIENT)
Age: 67
End: 2022-10-19

## 2022-10-19 LAB — BACTERIA UR CULT: NORMAL

## 2022-10-20 LAB
CULTURE RESULTS: SIGNIFICANT CHANGE UP
SPECIMEN SOURCE: SIGNIFICANT CHANGE UP

## 2022-10-23 ENCOUNTER — LABORATORY RESULT (OUTPATIENT)
Age: 67
End: 2022-10-23

## 2022-10-26 ENCOUNTER — NON-APPOINTMENT (OUTPATIENT)
Age: 67
End: 2022-10-26

## 2022-10-26 ENCOUNTER — OUTPATIENT (OUTPATIENT)
Dept: OUTPATIENT SERVICES | Facility: HOSPITAL | Age: 67
LOS: 1 days | Discharge: HOME | End: 2022-10-26

## 2022-10-26 ENCOUNTER — APPOINTMENT (OUTPATIENT)
Dept: UROLOGY | Facility: HOSPITAL | Age: 67
End: 2022-10-26

## 2022-10-26 ENCOUNTER — TRANSCRIPTION ENCOUNTER (OUTPATIENT)
Age: 67
End: 2022-10-26

## 2022-10-26 VITALS — DIASTOLIC BLOOD PRESSURE: 80 MMHG | RESPIRATION RATE: 18 BRPM | SYSTOLIC BLOOD PRESSURE: 127 MMHG | HEART RATE: 65 BPM

## 2022-10-26 VITALS
SYSTOLIC BLOOD PRESSURE: 118 MMHG | WEIGHT: 184.09 LBS | HEART RATE: 54 BPM | TEMPERATURE: 98 F | RESPIRATION RATE: 17 BRPM | HEIGHT: 63 IN | DIASTOLIC BLOOD PRESSURE: 64 MMHG | OXYGEN SATURATION: 100 %

## 2022-10-26 DIAGNOSIS — E89.0 POSTPROCEDURAL HYPOTHYROIDISM: Chronic | ICD-10-CM

## 2022-10-26 DIAGNOSIS — Z98.84 BARIATRIC SURGERY STATUS: Chronic | ICD-10-CM

## 2022-10-26 PROCEDURE — 50590 FRAGMENTING OF KIDNEY STONE: CPT | Mod: LT

## 2022-10-26 PROCEDURE — 52310 CYSTOSCOPY AND TREATMENT: CPT

## 2022-10-26 RX ORDER — HYDROMORPHONE HYDROCHLORIDE 2 MG/ML
0.5 INJECTION INTRAMUSCULAR; INTRAVENOUS; SUBCUTANEOUS ONCE
Refills: 0 | Status: DISCONTINUED | OUTPATIENT
Start: 2022-10-26 | End: 2022-10-26

## 2022-10-26 RX ORDER — SODIUM CHLORIDE 9 MG/ML
1000 INJECTION, SOLUTION INTRAVENOUS
Refills: 0 | Status: DISCONTINUED | OUTPATIENT
Start: 2022-10-26 | End: 2022-10-26

## 2022-10-26 RX ORDER — LEVOTHYROXINE SODIUM 125 MCG
250 TABLET ORAL
Qty: 0 | Refills: 0 | DISCHARGE

## 2022-10-26 RX ORDER — ESOMEPRAZOLE MAGNESIUM 40 MG/1
1 CAPSULE, DELAYED RELEASE ORAL
Qty: 0 | Refills: 0 | DISCHARGE

## 2022-10-26 RX ORDER — ONDANSETRON 8 MG/1
4 TABLET, FILM COATED ORAL ONCE
Refills: 0 | Status: DISCONTINUED | OUTPATIENT
Start: 2022-10-26 | End: 2022-10-26

## 2022-10-26 RX ORDER — ERGOCALCIFEROL 1.25 MG/1
0 CAPSULE ORAL
Qty: 0 | Refills: 0 | DISCHARGE

## 2022-10-26 RX ORDER — PHENAZOPYRIDINE HCL 100 MG
2 TABLET ORAL
Qty: 0 | Refills: 0 | DISCHARGE

## 2022-10-26 NOTE — ASU PATIENT PROFILE, ADULT - FALL HARM RISK - HARM RISK INTERVENTIONS
Universal Safety Interventions
Communicate Risk of Fall with Harm to all staff/Reinforce activity limits and safety measures with patient and family/Tailored Fall Risk Interventions/Visual Cue: Yellow wristband and red socks/Bed in lowest position, wheels locked, appropriate side rails in place/Call bell, personal items and telephone in reach/Instruct patient to call for assistance before getting out of bed or chair/Non-slip footwear when patient is out of bed/[place] to call system/Physically safe environment - no spills, clutter or unnecessary equipment/Purposeful Proactive Rounding/Room/bathroom lighting operational, light cord in reach

## 2022-10-26 NOTE — CHART NOTE - NSCHARTNOTEFT_GEN_A_CORE
PACU ANESTHESIA ADMISSION NOTE      Procedure: Lithotripsy      Post op diagnosis:      ____  Intubated  TV:______       Rate: ______      FiO2: ______    __x__  Patent Airway    __x__  Full return of protective reflexes    __x__  Full recovery from anesthesia / back to baseline     Vitals:   T:    97.5       R:      16            BP:       140/70           Sat:    99               P:   60    Mental Status:  __x__ Awake   _____ Alert   _____ Drowsy   _____ Sedated    Nausea/Vomiting:  __x__ NO  ______Yes,   See Post - Op Orders          Pain Scale (0-10):  __0___    Treatment: ____ None    ___x_ See Post - Op/PCA Orders    Post - Operative Fluids:   ____ Oral   ___x_ See Post - Op Orders    Plan: Discharge:   _x___Home       _____Floor     _____Critical Care    _____  Other:_________________    Comments:

## 2022-10-28 ENCOUNTER — NON-APPOINTMENT (OUTPATIENT)
Age: 67
End: 2022-10-28

## 2022-10-31 DIAGNOSIS — N20.0 CALCULUS OF KIDNEY: ICD-10-CM

## 2022-10-31 DIAGNOSIS — Z98.84 BARIATRIC SURGERY STATUS: ICD-10-CM

## 2022-10-31 DIAGNOSIS — Z87.891 PERSONAL HISTORY OF NICOTINE DEPENDENCE: ICD-10-CM

## 2022-10-31 DIAGNOSIS — E89.0 POSTPROCEDURAL HYPOTHYROIDISM: ICD-10-CM

## 2022-10-31 DIAGNOSIS — E66.9 OBESITY, UNSPECIFIED: ICD-10-CM

## 2022-10-31 DIAGNOSIS — K21.9 GASTRO-ESOPHAGEAL REFLUX DISEASE WITHOUT ESOPHAGITIS: ICD-10-CM

## 2022-10-31 DIAGNOSIS — G47.33 OBSTRUCTIVE SLEEP APNEA (ADULT) (PEDIATRIC): ICD-10-CM

## 2022-10-31 PROBLEM — E03.9 HYPOTHYROIDISM, UNSPECIFIED: Chronic | Status: ACTIVE | Noted: 2022-10-18

## 2022-10-31 PROBLEM — Z86.39 PERSONAL HISTORY OF OTHER ENDOCRINE, NUTRITIONAL AND METABOLIC DISEASE: Chronic | Status: ACTIVE | Noted: 2022-10-18

## 2022-12-06 ENCOUNTER — APPOINTMENT (OUTPATIENT)
Dept: UROLOGY | Facility: CLINIC | Age: 67
End: 2022-12-06

## 2022-12-06 VITALS
DIASTOLIC BLOOD PRESSURE: 77 MMHG | OXYGEN SATURATION: 98 % | HEIGHT: 62 IN | HEART RATE: 68 BPM | RESPIRATION RATE: 18 BRPM | BODY MASS INDEX: 34.41 KG/M2 | SYSTOLIC BLOOD PRESSURE: 133 MMHG | WEIGHT: 187 LBS | TEMPERATURE: 97 F

## 2022-12-06 DIAGNOSIS — Z00.00 ENCOUNTER FOR GENERAL ADULT MEDICAL EXAMINATION W/OUT ABNORMAL FINDINGS: ICD-10-CM

## 2022-12-06 PROCEDURE — 99024 POSTOP FOLLOW-UP VISIT: CPT

## 2022-12-06 NOTE — HISTORY OF PRESENT ILLNESS
[FreeTextEntry1] : 68 y/o female who was seen in follow up today. She had a large branched calculi that was initially treated with ureteroscopy and laser lithotripsy and stent placement. Then, she was also treated with ESWL. Pt is doing well overall and has passed a fair amount of fragments. She denies any pain or discomfort. She denies and fevers or chills. \par \par XR Abdomen 11/15/22\par - removal of left ureteral stent\par - cluster of fragmented calculi in the region of the left kidney measuring 13 x 32 mm. \par - no calculi seen along the course of the ureter, bladder, or right kidney \par \par XR Abdomen 9/20/22:\par - fragmented calculi overlie the left renal contour measuring ~ 3.6 x 1.6 cm. \par - probable lower left pelvic phlebolith and left ureteral stent. \par

## 2022-12-09 LAB
BILIRUB UR QL STRIP: NORMAL
COLLECTION METHOD: NORMAL
GLUCOSE UR-MCNC: NORMAL
HCG UR QL: 0.2 EU/DL
HGB UR QL STRIP.AUTO: NORMAL
KETONES UR-MCNC: NORMAL
LEUKOCYTE ESTERASE UR QL STRIP: NORMAL
NITRITE UR QL STRIP: NORMAL
PH UR STRIP: 5.5
PROT UR STRIP-MCNC: NORMAL
SP GR UR STRIP: 1.01

## 2023-02-24 ENCOUNTER — APPOINTMENT (OUTPATIENT)
Dept: NEUROLOGY | Facility: CLINIC | Age: 68
End: 2023-02-24

## 2023-05-09 ENCOUNTER — APPOINTMENT (OUTPATIENT)
Dept: UROLOGY | Facility: CLINIC | Age: 68
End: 2023-05-09
Payer: MEDICARE

## 2023-05-09 VITALS
HEART RATE: 63 BPM | BODY MASS INDEX: 29.03 KG/M2 | HEIGHT: 67 IN | DIASTOLIC BLOOD PRESSURE: 62 MMHG | SYSTOLIC BLOOD PRESSURE: 143 MMHG | WEIGHT: 185 LBS

## 2023-05-09 DIAGNOSIS — R31.29 OTHER MICROSCOPIC HEMATURIA: ICD-10-CM

## 2023-05-09 DIAGNOSIS — N20.0 CALCULUS OF KIDNEY: ICD-10-CM

## 2023-05-09 DIAGNOSIS — M54.50 LOW BACK PAIN, UNSPECIFIED: ICD-10-CM

## 2023-05-09 PROCEDURE — 99214 OFFICE O/P EST MOD 30 MIN: CPT

## 2023-05-12 PROBLEM — M54.50 LUMBAGO: Status: ACTIVE | Noted: 2021-07-07

## 2023-05-12 PROBLEM — N20.0 NEPHROLITHIASIS: Status: ACTIVE | Noted: 2022-07-25

## 2023-05-12 PROBLEM — R31.29 HEMATURIA, MICROSCOPIC: Status: ACTIVE | Noted: 2022-07-25

## 2023-05-12 RX ORDER — IBUPROFEN 800 MG/1
800 TABLET, FILM COATED ORAL
Qty: 45 | Refills: 0 | Status: ACTIVE | COMMUNITY
Start: 2023-05-06

## 2023-05-12 RX ORDER — TIZANIDINE 4 MG/1
4 TABLET ORAL
Qty: 14 | Refills: 0 | Status: ACTIVE | COMMUNITY
Start: 2023-03-28

## 2023-05-12 RX ORDER — MELOXICAM 15 MG/1
15 TABLET ORAL
Qty: 14 | Refills: 0 | Status: ACTIVE | COMMUNITY
Start: 2023-03-28

## 2023-05-12 RX ORDER — NIRMATRELVIR AND RITONAVIR 150-100 MG
10 X 150 MG & KIT ORAL
Qty: 20 | Refills: 0 | Status: ACTIVE | COMMUNITY
Start: 2023-01-04

## 2023-05-12 RX ORDER — TRAMADOL HYDROCHLORIDE AND ACETAMINOPHEN 37.5; 325 MG/1; MG/1
37.5-325 TABLET, FILM COATED ORAL
Qty: 40 | Refills: 0 | Status: ACTIVE | COMMUNITY
Start: 2023-05-05

## 2023-05-12 RX ORDER — SULFAMETHOXAZOLE AND TRIMETHOPRIM 800; 160 MG/1; MG/1
800-160 TABLET ORAL
Qty: 20 | Refills: 0 | Status: ACTIVE | COMMUNITY
Start: 2022-12-09

## 2023-05-12 NOTE — HISTORY OF PRESENT ILLNESS
[FreeTextEntry1] : 66 y/o female with a hx of stone disease who is seen in follow up. She is doing well overall, but does complain of some lower back pain. I have reviewed a KUB which shows 2 stone fragments measuring 6 mm each in the left kidney. \par \par I have reviewed the KUB noted below:\par \par XR Abdomen 4/24/23:\par - Suspect at least 2 left renal calculi at L3 measuring up to 6 mm.\par - Calculi, left pelvis in linear array, possibly within the left ureter. \par

## 2023-05-12 NOTE — ASSESSMENT
[FreeTextEntry1] : 66 y/o female with two 6 mm stone fragments within the left kidney. It is difficult to know whether she has any fragments in the ureter--I think that is unlikely and most likely, she just has some phleboliths. We discussed these issues in detail and I asked that she obtain a CT to make certain that there is no ureteral obstruction. She is agreeable and all her questions were otherwise answered. Total time=30 min.\par \par The submitted E/M billing level for this visit reflects the total time spent on the day of the visit including face-to-face time spent with the patient, non-face-to-face review of medical records and relevant information, documentation, and asynchronous communication with the patient after a visit via phone, email, or patient’s EHR portal after the visit. \par The medical records reviewed are either scanned into the chart or reviewed with the patient using a patient’s electronic medical records portal for patients with records not available to [facility] via electronic transmission platforms from other institutions and labs. \par Time spend counseling and performing coordination of care was also included in determining the appropriate EM billing level.\par \par I have reviewed and verified information regarding the chief complaint and history recorded by the ancillary staff and/or the patient. I have independently reviewed and interpreted tests performed by other physicians and facilities as necessary. \par \par I have discussed with the patient differential diagnosis, reason for auxiliary tests if ordered, risks, benefits, alternatives, and complications of each form of therapy were discussed.\par

## 2023-06-13 ENCOUNTER — APPOINTMENT (OUTPATIENT)
Dept: NEUROLOGY | Facility: CLINIC | Age: 68
End: 2023-06-13

## 2023-06-20 ENCOUNTER — APPOINTMENT (OUTPATIENT)
Dept: UROLOGY | Facility: CLINIC | Age: 68
End: 2023-06-20

## 2023-09-27 NOTE — ASU DISCHARGE PLAN (ADULT/PEDIATRIC) - RETURN TO WORK/SCHOOL
Please call and schedule follow-up appointment with your orthopedic specialist or primary care doctor    Please continue taking the meloxicam you are also given Flexeril to take at night    Please return to the ER if your symptoms worsen or if you develop any redness or swelling to the knee  
No...

## 2023-12-22 ENCOUNTER — INPATIENT (INPATIENT)
Facility: HOSPITAL | Age: 68
LOS: 2 days | Discharge: HOME HEALTH | DRG: 481 | End: 2023-12-25
Attending: STUDENT IN AN ORGANIZED HEALTH CARE EDUCATION/TRAINING PROGRAM | Admitting: INTERNAL MEDICINE
Payer: MEDICARE

## 2023-12-22 VITALS
TEMPERATURE: 98 F | HEIGHT: 62 IN | SYSTOLIC BLOOD PRESSURE: 149 MMHG | RESPIRATION RATE: 18 BRPM | HEART RATE: 58 BPM | WEIGHT: 179.9 LBS | OXYGEN SATURATION: 99 % | DIASTOLIC BLOOD PRESSURE: 74 MMHG

## 2023-12-22 DIAGNOSIS — S72.002A FRACTURE OF UNSPECIFIED PART OF NECK OF LEFT FEMUR, INITIAL ENCOUNTER FOR CLOSED FRACTURE: ICD-10-CM

## 2023-12-22 DIAGNOSIS — E89.0 POSTPROCEDURAL HYPOTHYROIDISM: Chronic | ICD-10-CM

## 2023-12-22 DIAGNOSIS — Z98.84 BARIATRIC SURGERY STATUS: Chronic | ICD-10-CM

## 2023-12-22 LAB
ALBUMIN SERPL ELPH-MCNC: 3.9 G/DL — SIGNIFICANT CHANGE UP (ref 3.5–5.2)
ALBUMIN SERPL ELPH-MCNC: 3.9 G/DL — SIGNIFICANT CHANGE UP (ref 3.5–5.2)
ALP SERPL-CCNC: 114 U/L — SIGNIFICANT CHANGE UP (ref 30–115)
ALP SERPL-CCNC: 114 U/L — SIGNIFICANT CHANGE UP (ref 30–115)
ALT FLD-CCNC: 15 U/L — SIGNIFICANT CHANGE UP (ref 0–41)
ALT FLD-CCNC: 15 U/L — SIGNIFICANT CHANGE UP (ref 0–41)
ANION GAP SERPL CALC-SCNC: 10 MMOL/L — SIGNIFICANT CHANGE UP (ref 7–14)
ANION GAP SERPL CALC-SCNC: 10 MMOL/L — SIGNIFICANT CHANGE UP (ref 7–14)
AST SERPL-CCNC: 22 U/L — SIGNIFICANT CHANGE UP (ref 0–41)
AST SERPL-CCNC: 22 U/L — SIGNIFICANT CHANGE UP (ref 0–41)
BASOPHILS # BLD AUTO: 0.03 K/UL — SIGNIFICANT CHANGE UP (ref 0–0.2)
BASOPHILS # BLD AUTO: 0.03 K/UL — SIGNIFICANT CHANGE UP (ref 0–0.2)
BASOPHILS NFR BLD AUTO: 0.4 % — SIGNIFICANT CHANGE UP (ref 0–1)
BASOPHILS NFR BLD AUTO: 0.4 % — SIGNIFICANT CHANGE UP (ref 0–1)
BILIRUB SERPL-MCNC: 0.3 MG/DL — SIGNIFICANT CHANGE UP (ref 0.2–1.2)
BILIRUB SERPL-MCNC: 0.3 MG/DL — SIGNIFICANT CHANGE UP (ref 0.2–1.2)
BLD GP AB SCN SERPL QL: SIGNIFICANT CHANGE UP
BLD GP AB SCN SERPL QL: SIGNIFICANT CHANGE UP
BUN SERPL-MCNC: 19 MG/DL — SIGNIFICANT CHANGE UP (ref 10–20)
BUN SERPL-MCNC: 19 MG/DL — SIGNIFICANT CHANGE UP (ref 10–20)
CALCIUM SERPL-MCNC: 8.9 MG/DL — SIGNIFICANT CHANGE UP (ref 8.4–10.5)
CALCIUM SERPL-MCNC: 8.9 MG/DL — SIGNIFICANT CHANGE UP (ref 8.4–10.5)
CHLORIDE SERPL-SCNC: 103 MMOL/L — SIGNIFICANT CHANGE UP (ref 98–110)
CHLORIDE SERPL-SCNC: 103 MMOL/L — SIGNIFICANT CHANGE UP (ref 98–110)
CO2 SERPL-SCNC: 25 MMOL/L — SIGNIFICANT CHANGE UP (ref 17–32)
CO2 SERPL-SCNC: 25 MMOL/L — SIGNIFICANT CHANGE UP (ref 17–32)
CREAT SERPL-MCNC: 0.7 MG/DL — SIGNIFICANT CHANGE UP (ref 0.7–1.5)
CREAT SERPL-MCNC: 0.7 MG/DL — SIGNIFICANT CHANGE UP (ref 0.7–1.5)
EGFR: 95 ML/MIN/1.73M2 — SIGNIFICANT CHANGE UP
EGFR: 95 ML/MIN/1.73M2 — SIGNIFICANT CHANGE UP
EOSINOPHIL # BLD AUTO: 0.14 K/UL — SIGNIFICANT CHANGE UP (ref 0–0.7)
EOSINOPHIL # BLD AUTO: 0.14 K/UL — SIGNIFICANT CHANGE UP (ref 0–0.7)
EOSINOPHIL NFR BLD AUTO: 1.9 % — SIGNIFICANT CHANGE UP (ref 0–8)
EOSINOPHIL NFR BLD AUTO: 1.9 % — SIGNIFICANT CHANGE UP (ref 0–8)
GLUCOSE SERPL-MCNC: 133 MG/DL — HIGH (ref 70–99)
GLUCOSE SERPL-MCNC: 133 MG/DL — HIGH (ref 70–99)
HCT VFR BLD CALC: 35.8 % — LOW (ref 37–47)
HCT VFR BLD CALC: 35.8 % — LOW (ref 37–47)
HGB BLD-MCNC: 11.4 G/DL — LOW (ref 12–16)
HGB BLD-MCNC: 11.4 G/DL — LOW (ref 12–16)
IMM GRANULOCYTES NFR BLD AUTO: 0.3 % — SIGNIFICANT CHANGE UP (ref 0.1–0.3)
IMM GRANULOCYTES NFR BLD AUTO: 0.3 % — SIGNIFICANT CHANGE UP (ref 0.1–0.3)
LYMPHOCYTES # BLD AUTO: 2.35 K/UL — SIGNIFICANT CHANGE UP (ref 1.2–3.4)
LYMPHOCYTES # BLD AUTO: 2.35 K/UL — SIGNIFICANT CHANGE UP (ref 1.2–3.4)
LYMPHOCYTES # BLD AUTO: 31.2 % — SIGNIFICANT CHANGE UP (ref 20.5–51.1)
LYMPHOCYTES # BLD AUTO: 31.2 % — SIGNIFICANT CHANGE UP (ref 20.5–51.1)
MCHC RBC-ENTMCNC: 29.3 PG — SIGNIFICANT CHANGE UP (ref 27–31)
MCHC RBC-ENTMCNC: 29.3 PG — SIGNIFICANT CHANGE UP (ref 27–31)
MCHC RBC-ENTMCNC: 31.8 G/DL — LOW (ref 32–37)
MCHC RBC-ENTMCNC: 31.8 G/DL — LOW (ref 32–37)
MCV RBC AUTO: 92 FL — SIGNIFICANT CHANGE UP (ref 81–99)
MCV RBC AUTO: 92 FL — SIGNIFICANT CHANGE UP (ref 81–99)
MONOCYTES # BLD AUTO: 0.52 K/UL — SIGNIFICANT CHANGE UP (ref 0.1–0.6)
MONOCYTES # BLD AUTO: 0.52 K/UL — SIGNIFICANT CHANGE UP (ref 0.1–0.6)
MONOCYTES NFR BLD AUTO: 6.9 % — SIGNIFICANT CHANGE UP (ref 1.7–9.3)
MONOCYTES NFR BLD AUTO: 6.9 % — SIGNIFICANT CHANGE UP (ref 1.7–9.3)
NEUTROPHILS # BLD AUTO: 4.48 K/UL — SIGNIFICANT CHANGE UP (ref 1.4–6.5)
NEUTROPHILS # BLD AUTO: 4.48 K/UL — SIGNIFICANT CHANGE UP (ref 1.4–6.5)
NEUTROPHILS NFR BLD AUTO: 59.3 % — SIGNIFICANT CHANGE UP (ref 42.2–75.2)
NEUTROPHILS NFR BLD AUTO: 59.3 % — SIGNIFICANT CHANGE UP (ref 42.2–75.2)
NRBC # BLD: 0 /100 WBCS — SIGNIFICANT CHANGE UP (ref 0–0)
NRBC # BLD: 0 /100 WBCS — SIGNIFICANT CHANGE UP (ref 0–0)
PLATELET # BLD AUTO: 311 K/UL — SIGNIFICANT CHANGE UP (ref 130–400)
PLATELET # BLD AUTO: 311 K/UL — SIGNIFICANT CHANGE UP (ref 130–400)
PMV BLD: 9.5 FL — SIGNIFICANT CHANGE UP (ref 7.4–10.4)
PMV BLD: 9.5 FL — SIGNIFICANT CHANGE UP (ref 7.4–10.4)
POTASSIUM SERPL-MCNC: 4.1 MMOL/L — SIGNIFICANT CHANGE UP (ref 3.5–5)
POTASSIUM SERPL-MCNC: 4.1 MMOL/L — SIGNIFICANT CHANGE UP (ref 3.5–5)
POTASSIUM SERPL-SCNC: 4.1 MMOL/L — SIGNIFICANT CHANGE UP (ref 3.5–5)
POTASSIUM SERPL-SCNC: 4.1 MMOL/L — SIGNIFICANT CHANGE UP (ref 3.5–5)
PROT SERPL-MCNC: 5.8 G/DL — LOW (ref 6–8)
PROT SERPL-MCNC: 5.8 G/DL — LOW (ref 6–8)
RBC # BLD: 3.89 M/UL — LOW (ref 4.2–5.4)
RBC # BLD: 3.89 M/UL — LOW (ref 4.2–5.4)
RBC # FLD: 12.6 % — SIGNIFICANT CHANGE UP (ref 11.5–14.5)
RBC # FLD: 12.6 % — SIGNIFICANT CHANGE UP (ref 11.5–14.5)
SODIUM SERPL-SCNC: 138 MMOL/L — SIGNIFICANT CHANGE UP (ref 135–146)
SODIUM SERPL-SCNC: 138 MMOL/L — SIGNIFICANT CHANGE UP (ref 135–146)
WBC # BLD: 7.54 K/UL — SIGNIFICANT CHANGE UP (ref 4.8–10.8)
WBC # BLD: 7.54 K/UL — SIGNIFICANT CHANGE UP (ref 4.8–10.8)
WBC # FLD AUTO: 7.54 K/UL — SIGNIFICANT CHANGE UP (ref 4.8–10.8)
WBC # FLD AUTO: 7.54 K/UL — SIGNIFICANT CHANGE UP (ref 4.8–10.8)

## 2023-12-22 PROCEDURE — 73552 X-RAY EXAM OF FEMUR 2/>: CPT | Mod: 26,LT

## 2023-12-22 PROCEDURE — 86901 BLOOD TYPING SEROLOGIC RH(D): CPT

## 2023-12-22 PROCEDURE — 97110 THERAPEUTIC EXERCISES: CPT | Mod: GP

## 2023-12-22 PROCEDURE — C1769: CPT

## 2023-12-22 PROCEDURE — 73502 X-RAY EXAM HIP UNI 2-3 VIEWS: CPT | Mod: 26,LT

## 2023-12-22 PROCEDURE — 80053 COMPREHEN METABOLIC PANEL: CPT

## 2023-12-22 PROCEDURE — 72170 X-RAY EXAM OF PELVIS: CPT | Mod: 26,XE

## 2023-12-22 PROCEDURE — 99221 1ST HOSP IP/OBS SF/LOW 40: CPT

## 2023-12-22 PROCEDURE — 97162 PT EVAL MOD COMPLEX 30 MIN: CPT | Mod: GP

## 2023-12-22 PROCEDURE — 86900 BLOOD TYPING SEROLOGIC ABO: CPT

## 2023-12-22 PROCEDURE — 36415 COLL VENOUS BLD VENIPUNCTURE: CPT

## 2023-12-22 PROCEDURE — 97116 GAIT TRAINING THERAPY: CPT | Mod: GP

## 2023-12-22 PROCEDURE — 86850 RBC ANTIBODY SCREEN: CPT

## 2023-12-22 PROCEDURE — 97165 OT EVAL LOW COMPLEX 30 MIN: CPT | Mod: GO

## 2023-12-22 PROCEDURE — C1713: CPT

## 2023-12-22 PROCEDURE — 99285 EMERGENCY DEPT VISIT HI MDM: CPT | Mod: FS

## 2023-12-22 PROCEDURE — 73501 X-RAY EXAM HIP UNI 1 VIEW: CPT | Mod: LT

## 2023-12-22 PROCEDURE — 85025 COMPLETE CBC W/AUTO DIFF WBC: CPT

## 2023-12-22 PROCEDURE — 71045 X-RAY EXAM CHEST 1 VIEW: CPT

## 2023-12-22 PROCEDURE — C1889: CPT

## 2023-12-22 PROCEDURE — 86803 HEPATITIS C AB TEST: CPT

## 2023-12-22 PROCEDURE — 85027 COMPLETE CBC AUTOMATED: CPT

## 2023-12-22 RX ORDER — ACETAMINOPHEN 500 MG
650 TABLET ORAL EVERY 6 HOURS
Refills: 0 | Status: DISCONTINUED | OUTPATIENT
Start: 2023-12-22 | End: 2023-12-23

## 2023-12-22 RX ORDER — ONDANSETRON 8 MG/1
4 TABLET, FILM COATED ORAL EVERY 4 HOURS
Refills: 0 | Status: DISCONTINUED | OUTPATIENT
Start: 2023-12-22 | End: 2023-12-23

## 2023-12-22 RX ORDER — MORPHINE SULFATE 50 MG/1
4 CAPSULE, EXTENDED RELEASE ORAL ONCE
Refills: 0 | Status: DISCONTINUED | OUTPATIENT
Start: 2023-12-22 | End: 2023-12-22

## 2023-12-22 RX ORDER — ONDANSETRON 8 MG/1
4 TABLET, FILM COATED ORAL ONCE
Refills: 0 | Status: COMPLETED | OUTPATIENT
Start: 2023-12-22 | End: 2023-12-22

## 2023-12-22 RX ORDER — LEVOTHYROXINE SODIUM 125 MCG
125 TABLET ORAL
Refills: 0 | Status: DISCONTINUED | OUTPATIENT
Start: 2023-12-22 | End: 2023-12-23

## 2023-12-22 RX ORDER — PANTOPRAZOLE SODIUM 20 MG/1
40 TABLET, DELAYED RELEASE ORAL DAILY
Refills: 0 | Status: DISCONTINUED | OUTPATIENT
Start: 2023-12-22 | End: 2023-12-23

## 2023-12-22 RX ORDER — LEVOTHYROXINE SODIUM 125 MCG
124 TABLET ORAL DAILY
Refills: 0 | Status: DISCONTINUED | OUTPATIENT
Start: 2023-12-22 | End: 2023-12-22

## 2023-12-22 RX ORDER — HEPARIN SODIUM 5000 [USP'U]/ML
5000 INJECTION INTRAVENOUS; SUBCUTANEOUS ONCE
Refills: 0 | Status: DISCONTINUED | OUTPATIENT
Start: 2023-12-22 | End: 2023-12-23

## 2023-12-22 RX ORDER — LEVOTHYROXINE SODIUM 125 MCG
250 TABLET ORAL DAILY
Refills: 0 | Status: DISCONTINUED | OUTPATIENT
Start: 2023-12-22 | End: 2023-12-22

## 2023-12-22 RX ORDER — INFLUENZA VIRUS VACCINE 15; 15; 15; 15 UG/.5ML; UG/.5ML; UG/.5ML; UG/.5ML
0.7 SUSPENSION INTRAMUSCULAR ONCE
Refills: 0 | Status: DISCONTINUED | OUTPATIENT
Start: 2023-12-22 | End: 2023-12-25

## 2023-12-22 RX ORDER — PANTOPRAZOLE SODIUM 20 MG/1
40 TABLET, DELAYED RELEASE ORAL
Refills: 0 | Status: DISCONTINUED | OUTPATIENT
Start: 2023-12-22 | End: 2023-12-22

## 2023-12-22 RX ORDER — TRAMADOL HYDROCHLORIDE 50 MG/1
25 TABLET ORAL EVERY 6 HOURS
Refills: 0 | Status: DISCONTINUED | OUTPATIENT
Start: 2023-12-22 | End: 2023-12-23

## 2023-12-22 RX ORDER — MORPHINE SULFATE 50 MG/1
6 CAPSULE, EXTENDED RELEASE ORAL ONCE
Refills: 0 | Status: DISCONTINUED | OUTPATIENT
Start: 2023-12-22 | End: 2023-12-22

## 2023-12-22 RX ORDER — ERGOCALCIFEROL 1.25 MG/1
50000 CAPSULE ORAL ONCE
Refills: 0 | Status: COMPLETED | OUTPATIENT
Start: 2023-12-22 | End: 2023-12-23

## 2023-12-22 RX ORDER — MORPHINE SULFATE 50 MG/1
2 CAPSULE, EXTENDED RELEASE ORAL EVERY 6 HOURS
Refills: 0 | Status: DISCONTINUED | OUTPATIENT
Start: 2023-12-22 | End: 2023-12-23

## 2023-12-22 RX ADMIN — MORPHINE SULFATE 6 MILLIGRAM(S): 50 CAPSULE, EXTENDED RELEASE ORAL at 20:34

## 2023-12-22 RX ADMIN — MORPHINE SULFATE 4 MILLIGRAM(S): 50 CAPSULE, EXTENDED RELEASE ORAL at 18:57

## 2023-12-22 RX ADMIN — ONDANSETRON 4 MILLIGRAM(S): 8 TABLET, FILM COATED ORAL at 18:57

## 2023-12-22 NOTE — ED PROVIDER NOTE - PHYSICAL EXAMINATION
Physical Exam    Vital Signs: I have reviewed the initial vital signs.  Constitutional: appears stated age, no acute distress  Eyes: Conjunctiva pink, Sclera clear  Cardiovascular: S1 and S2, regular rate, regular rhythm, well-perfused extremities, radial pulses equal and 2+, pedal pulses 2+ and equal  Respiratory: unlabored respiratory effort, clear to auscultation bilaterally no wheezing, rales and rhonchi  Gastrointestinal: soft, non-tender abdomen, no pulsatile mass, normal bowl sounds  Musculoskeletal: supple neck, no lower extremity edema, no midline tenderness, ttp to left hip.   Integumentary: warm, dry, no rash  Neurologic: awake, alert, nvi

## 2023-12-22 NOTE — H&P ADULT - NSHPPHYSICALEXAM_GEN_ALL_CORE
Vital Signs Last 24 Hrs  T(C): 36.6 (22 Dec 2023 18:27), Max: 36.6 (22 Dec 2023 18:27)  T(F): 97.8 (22 Dec 2023 18:27), Max: 97.8 (22 Dec 2023 18:27)  HR: 58 (22 Dec 2023 18:27) (58 - 58)  BP: 149/74 (22 Dec 2023 18:27) (149/74 - 149/74)  BP(mean): --  RR: 18 (22 Dec 2023 18:27) (18 - 18)  SpO2: 99% (22 Dec 2023 18:27) (99% - 99%)    Parameters below as of 22 Dec 2023 18:27  Patient On (Oxygen Delivery Method): room air      GENERAL:  67y/o Female NAD, resting comfortably.  HEAD:  Atraumatic, Normocephalic  EYES: EOMI, PERRLA, conjunctiva and sclera clear  NECK: Supple, No JVD, no cervical lymphadenopathy, non-tender  CHEST/LUNG: Clear to auscultation bilaterally; No wheeze, rhonchi, or rales  HEART: Regular rate and rhythm; S1&S2  ABDOMEN: Soft, Nontender, Nondistended x 4 quadrants; Bowel sounds present  EXTREMITIES:   left leg is shorter. also pain on palpation on left hip area. Peripheral Pulses Present, No clubbing, no cyanosis, or no edema, no calf tenderness  PSYCH: AAOx3, cooperative, appropriate  NEUROLOGY: WNL  SKIN: WNL

## 2023-12-22 NOTE — H&P ADULT - ASSESSMENT
67 yo female presents to er for fall, occurred today, left sided, c/o left hip pain.    Left hip FX:  orthopedic consult  prn pain medication  NPO  after MN    continue home meds as per PMD    hypothyroid:  synthroid 250mcg po QD    prophylaxis Gi/VTE    CAse D/W Dr Mcmahon 69 yo female presents to er for fall, occurred today, left sided, c/o left hip pain.    Left hip FX:  orthopedic consult  prn pain medication  NPO  after MN    continue home meds as per PMD    hypothyroid:  synthroid 250mcg po QD    prophylaxis Gi/VTE    CAse D/W Dr Mcmahon 67 yo female presents to er for fall, occurred today, left sided, c/o left hip pain.    Left hip FX/ acute comminuted proximal humeral shaft fracture:  orthopedic consult  prn pain medication  NPO  after MN    continue home meds as per PMD    hypothyroid:  synthroid 250mcg po QD    prophylaxis Gi/VTE    CAse D/W Dr Mcmahon 69 yo female presents to er for fall, occurred today, left sided, c/o left hip pain.    Left hip FX/ acute comminuted proximal humeral shaft fracture:  orthopedic consult  prn pain medication  NPO  after MN    continue home meds as per PMD    hypothyroid:  synthroid 250mcg po QD    prophylaxis Gi/VTE    CAse D/W Dr Mcmahon 67 yo female presents to er for fall, occurred today, left sided, c/o left hip pain.    Left hip FX/ acute comminuted proximal femoral shaft fracture:  orthopedic consult  prn pain medication  NPO  after MN    continue home meds as per PMD    hypothyroid:  synthroid 250mcg po QD    prophylaxis Gi/VTE    CAse D/W Dr Mcmahon 69 yo female presents to er for fall, occurred today, left sided, c/o left hip pain.    Left hip FX/ acute comminuted proximal femoral shaft fracture:  orthopedic consult  prn pain medication  NPO  after MN    continue home meds as per PMD    hypothyroid:  synthroid 250mcg po QD    prophylaxis Gi/VTE    CAse D/W Dr Mcmahon

## 2023-12-22 NOTE — ED ADULT NURSE NOTE - NS ED NURSE LEVEL OF CONSCIOUSNESS MENTAL STATUS
History:  Negra is a 13 year old female who presents to the office with an injury to her left knee. She was bouncing on a trampoline last week and fell on the trampoline with someone else landing on top of her knee. She had improvement in her knee pain until today when she hit her knee on a chair. Since then she has had increased pain to the front of her left knee. She has been taking some Excedrin over the past week for intermittent knee pain.    Past Medical History: Problem list was reviewed.     Current Outpatient Prescriptions   Medication   • methylphenidate (CONCERTA) 54 MG CR tablet   • methylpheniDATE (RITALIN) 10 MG tablet   • norgestimate-ethinyl estradiol, triphasic, (ORTHO TRI-CYCLEN, 28,) 0.18/0.215/0.25 MG-35 MCG tablet   • guanFACINE (INTUNIV) 2 MG TABLET SR 24 HR   • methylpheniDATE (RITALIN) 5 MG tablet   • triamcinolone (ARISTOCORT) 0.1 % cream   • benzoyl peroxide 5 % gel   • albuterol (PROAIR HFA) 108 (90 Base) MCG/ACT inhaler     No current facility-administered medications for this visit.         Allergies:  No allergy to medications.    Physical exam:  Vitals:    07/13/17 1421   BP: 100/62   Pulse: 64   Temp: 98.2 °F (36.8 °C)     General appearance: Patient is alert and well nourished in appearance.  Extremities: Examination of the left knee reveals tenderness across the patella as well as underneath the patella. There is some fullness under the patella but no significant swelling or erythema. The patient has full range of motion.    Lab: The patient was sent for an x-ray of the left knee which was reviewed in the office. There is no evidence of bony injury.    Assessment: Left knee injury    Plan: The knee was placed in a brace and the patient was instructed to wear this over the next few days. She can discontinue the brace as the pain decreases in her knee. She was encouraged to take ibuprofen 400 mg every 6 hours as needed for pain and follow-up if no significant improvement over the  next week. Follow-up otherwise as needed.     Awake/Alert/Cooperative

## 2023-12-22 NOTE — ED PROVIDER NOTE - NS ED ATTENDING STATEMENT MOD
This was a shared visit with the ANNABEL. I reviewed and verified the documentation and independently performed the documented:

## 2023-12-22 NOTE — ED ADULT NURSE NOTE - NSFALLHARMRISKINTERV_ED_ALL_ED
Assistance OOB with selected safe patient handling equipment if applicable/Assistance with ambulation/Communicate risk of Fall with Harm to all staff, patient, and family/Monitor gait and stability/Provide patient with walking aids/Provide visual cue: red socks, yellow wristband, yellow gown, etc/Reinforce activity limits and safety measures with patient and family/Bed in lowest position, wheels locked, appropriate side rails in place/Call bell, personal items and telephone in reach/Instruct patient to call for assistance before getting out of bed/chair/stretcher/Non-slip footwear applied when patient is off stretcher/[place] to call system/Physically safe environment - no spills, clutter or unnecessary equipment/Purposeful Proactive Rounding/Room/bathroom lighting operational, light cord in reach Assistance OOB with selected safe patient handling equipment if applicable/Assistance with ambulation/Communicate risk of Fall with Harm to all staff, patient, and family/Monitor gait and stability/Provide patient with walking aids/Provide visual cue: red socks, yellow wristband, yellow gown, etc/Reinforce activity limits and safety measures with patient and family/Bed in lowest position, wheels locked, appropriate side rails in place/Call bell, personal items and telephone in reach/Instruct patient to call for assistance before getting out of bed/chair/stretcher/Non-slip footwear applied when patient is off stretcher/[place] to call system/Physically safe environment - no spills, clutter or unnecessary equipment/Purposeful Proactive Rounding/Room/bathroom lighting operational, light cord in reach

## 2023-12-22 NOTE — ED PROVIDER NOTE - CLINICAL SUMMARY MEDICAL DECISION MAKING FREE TEXT BOX
we obtained x-rays of the pelvis femur and left hip per my independent evaluation of all x-rays this note significant fracture with pain and deformity at this point no evidence of neurovascular compromise replaced IV patient given multiple doses of IV pain medicine we obtain labs we discussed the case with service of attending orthopedic physician I will admit advised admit to [facility] for further evaluation family updated agreement plan of care we obtained x-rays of the pelvis femur and left hip per my independent evaluation of all x-rays this note significant fracture with pain and deformity at this point no evidence of neurovascular compromise replaced IV patient given multiple doses of IV pain medicine we obtain labs we discussed the case with service of attending orthopedic physician I will admit advised admit to [facility] for further evaluation family updated agreement plan of care

## 2023-12-22 NOTE — H&P ADULT - HISTORY OF PRESENT ILLNESS
67 yo female presents to er for fall, occurred today, left sided, c/o left hip pain. pt  with a pmh of GERD,  hypothyroidism,  shortened left leg. Denies fever, chills, cp, sob, neck pain, visual changes, nvd, dizziness, numbness, tingling, 69 yo female accompanied by her  and son at bed side. Pt presents to er for fall, occurred today, left sided, c/o left hip pain. Pt states that she was outside wearing slippers when she turned abruptly and fell on her behind. pt  with a pmh of GERD,  hypothyroidism,  shortened left leg. Denies fever, chills, cp, sob, neck pain, visual changes, nvd, dizziness, numbness, tingling, 67 yo female accompanied by her  and son at bed side. Pt presents to er for fall, occurred today, left sided, c/o left hip pain. Pt states that she was outside wearing slippers when she turned abruptly and fell on her behind. pt  with a pmh of GERD,  hypothyroidism,  shortened left leg. Denies fever, chills, cp, sob, neck pain, visual changes, nvd, dizziness, numbness, tingling,

## 2023-12-22 NOTE — PATIENT PROFILE ADULT - FUNCTIONAL ASSESSMENT - BASIC MOBILITY 5.
How Severe Is This Condition?: moderate
Additional History: Hx of same approximately 1-2 times per year.
2 = A lot of assistance

## 2023-12-22 NOTE — ED ADULT NURSE NOTE - NSICDXPASTSURGICALHX_GEN_ALL_CORE_FT
PAST SURGICAL HISTORY:  H/O gastric bypass     H/O partial thyroidectomy THYROID NODULE     sudden onset/constant

## 2023-12-22 NOTE — PATIENT PROFILE ADULT - FUNCTIONAL ASSESSMENT - BASIC MOBILITY 6.
2-calculated by average/Not able to assess (calculate score using [facility] averaging method)  2-calculated by average/Not able to assess (calculate score using [facility] averaging method)

## 2023-12-22 NOTE — ED PROVIDER NOTE - ATTENDING APP SHARED VISIT CONTRIBUTION OF CARE
I have personally performed a history and physical exam on this patient and personally directed the management of the patient. Patient is a 68-year-old female status post fall states that she was walking outside sustained a trip fell hit her leg on the left side unable to stand up secondary to pain pain is constant worse with movement moderate throbbing denies any LOC denies any vomiting visual changes neck pain chest pain shortness of breath abdominal pain other extremity pain or other back pain    On physical exam patient is normocephalic atraumatic pupils equal round react light accommodation extraocular muscles intact oropharynx clear chest clear to auscultation bilaterally abdomen soft nontender nondistended bowel sounds positive extremities patient has left lower extremity that significantly shortened externally rotated capillary refills normal bilaterally radial pulses 2+ pedal pulses 2+ and equal tenderness to palpation at the left hip consistent with most likely fracture    Assessment plan we obtained x-rays of the pelvis femur and left hip per my independent evaluation of all x-rays this note significant fracture with pain and deformity at this point no evidence of neurovascular compromise replaced IV patient given multiple doses of IV pain medicine we obtain labs we discussed the case with service of attending orthopedic physician I will admit advised admit to [facility] for further evaluation family updated agreement plan of care I have personally performed a history and physical exam on this patient and personally directed the management of the patient. Patient is a 68-year-old female status post fall states that she was walking outside sustained a trip fell hit her leg on the left side unable to stand up secondary to pain pain is constant worse with movement moderate throbbing denies any LOC denies any vomiting visual changes neck pain chest pain shortness of breath abdominal pain other extremity pain or other back pain    On physical exam patient is normocephalic atraumatic pupils equal round react light accommodation extraocular muscles intact oropharynx clear chest clear to auscultation bilaterally abdomen soft nontender nondistended bowel sounds positive extremities patient has left lower extremity that significantly shortened externally rotated capillary refills normal bilaterally radial pulses 2+ pedal pulses 2+ and equal tenderness to palpation at the left hip consistent with most likely fracture    Assessment plan we obtained x-rays of the pelvis femur and left hip per my independent evaluation of all x-rays this note significant fracture with pain and deformity at this point no evidence of neurovascular compromise replaced IV patient given multiple doses of IV pain medicine we obtain labs we discussed the case with service of attending orthopedic physician I will admit advised admit to [facility] for further evaluation family updated agreement plan of care

## 2023-12-22 NOTE — H&P ADULT - NSHPLABSRESULTS_GEN_ALL_CORE
11.4   7.54  )-----------( 311      ( 22 Dec 2023 19:00 )             35.8       12-22    138  |  103  |  19  ----------------------------<  133<H>  4.1   |  25  |  0.7    Ca    8.9      22 Dec 2023 19:00    TPro  5.8<L>  /  Alb  3.9  /  TBili  0.3  /  DBili  x   /  AST  22  /  ALT  15  /  AlkPhos  114  12-22              Urinalysis Basic - ( 22 Dec 2023 19:00 )    Color: x / Appearance: x / SG: x / pH: x  Gluc: 133 mg/dL / Ketone: x  / Bili: x / Urobili: x   Blood: x / Protein: x / Nitrite: x   Leuk Esterase: x / RBC: x / WBC x   Sq Epi: x / Non Sq Epi: x / Bacteria: x            Lactate Trend            CAPILLARY BLOOD GLUCOSE PROCEDURE DATE:  12/22/2023          INTERPRETATION:  Clinical History / Reason for exam: Left hip pain    2 views of the left hip, and AP view of the pelvis.    COMPARISON: None    Findings/  impression:    There is acute comminuted proximal humeral shaft fracture with extension   into the lesser trochanter, demonstrating 1.4 cm medial displacement of   the femoral shaft from the femoral neck.    There are moderate degenerative changes of the bilateral hips and   sacroiliac joints.    11.4   7.54  )-----------( 311      ( 22 Dec 2023 19:00 )             35.8       12-22    138  |  103  |  19  ----------------------------<  133<H>  4.1   |  25  |  0.7    Ca    8.9      22 Dec 2023 19:00    TPro  5.8<L>  /  Alb  3.9  /  TBili  0.3  /  DBili  x   /  AST  22  /  ALT  15  /  AlkPhos  114  12-22              Urinalysis Basic - ( 22 Dec 2023 19:00 )    Color: x / Appearance: x / SG: x / pH: x  Gluc: 133 mg/dL / Ketone: x  / Bili: x / Urobili: x   Blood: x / Protein: x / Nitrite: x   Leuk Esterase: x / RBC: x / WBC x   Sq Epi: x / Non Sq Epi: x / Bacteria: x            Lactate Trend            CAPILLARY BLOOD GLUCOSE PROCEDURE DATE:  12/22/2023          INTERPRETATION:  Clinical History / Reason for exam: Left hip pain    2 views of the left hip, and AP view of the pelvis.    COMPARISON: None    Findings/  impression:    There is acute comminuted proximal femoral shaft fracture with extension   into the lesser trochanter, demonstrating 1.4 cm medial displacement of   the femoral shaft from the femoral neck.    There are moderate degenerative changes of the bilateral hips and   sacroiliac joints.    11.4   7.54  )-----------( 311      ( 22 Dec 2023 19:00 )             35.8       12-22    138  |  103  |  19  ----------------------------<  133<H>  4.1   |  25  |  0.7    Ca    8.9      22 Dec 2023 19:00    TPro  5.8<L>  /  Alb  3.9  /  TBili  0.3  /  DBili  x   /  AST  22  /  ALT  15  /  AlkPhos  114  12-22              Urinalysis Basic - ( 22 Dec 2023 19:00 )    Color: x / Appearance: x / SG: x / pH: x  Gluc: 133 mg/dL / Ketone: x  / Bili: x / Urobili: x   Blood: x / Protein: x / Nitrite: x   Leuk Esterase: x / RBC: x / WBC x   Sq Epi: x / Non Sq Epi: x / Bacteria: x            Lactate Trend            CAPILLARY BLOOD GLUCOSE

## 2023-12-22 NOTE — PATIENT PROFILE ADULT - FALL HARM RISK - HARM RISK INTERVENTIONS
Assistance with ambulation/Assistance OOB with selected safe patient handling equipment/Communicate Risk of Fall with Harm to all staff/Discuss with provider need for PT consult/Monitor gait and stability/Reinforce activity limits and safety measures with patient and family/Tailored Fall Risk Interventions/Visual Cue: Yellow wristband and red socks/Bed in lowest position, wheels locked, appropriate side rails in place/Call bell, personal items and telephone in reach/Instruct patient to call for assistance before getting out of bed or chair/Non-slip footwear when patient is out of bed/[place] to call system/Physically safe environment - no spills, clutter or unnecessary equipment/Purposeful Proactive Rounding/Room/bathroom lighting operational, light cord in reach Assistance with ambulation/Assistance OOB with selected safe patient handling equipment/Communicate Risk of Fall with Harm to all staff/Discuss with provider need for PT consult/Monitor gait and stability/Reinforce activity limits and safety measures with patient and family/Tailored Fall Risk Interventions/Visual Cue: Yellow wristband and red socks/Bed in lowest position, wheels locked, appropriate side rails in place/Call bell, personal items and telephone in reach/Instruct patient to call for assistance before getting out of bed or chair/Non-slip footwear when patient is out of bed/[place] to call system/Physically safe environment - no spills, clutter or unnecessary equipment/Purposeful Proactive Rounding/Room/bathroom lighting operational, light cord in reach

## 2023-12-22 NOTE — ED PROVIDER NOTE - OBJECTIVE STATEMENT
67 yo female, pmh of hypothyroidism, gerd, presents to er for fall, occurred today, left sided, c/o left hip pain, on exam shortened left leg. Denies fever, chills, cp, sob, neck pain, visual changes, nvd, dizziness, numbness, tingling, chi, loc, ha.

## 2023-12-22 NOTE — ED ADULT NURSE NOTE - PRIMARY CARE PROVIDER
Gudelia from [facility] called for pt update. Report given.   ken

## 2023-12-22 NOTE — ED ADULT TRIAGE NOTE - CHIEF COMPLAINT QUOTE
As per EMS, patient tripped and fell outside. C/o of left hip pain, shortening present. Denies head trauma or LOC

## 2023-12-22 NOTE — ED ADULT NURSE NOTE - SUICIDE SCREENING QUESTION 3
Refer to the multicolored fact sheet for any problems you experience.  If you have difficulty urinating or unable to urinate in 8 hours after surgery, call your Dr., or return to [facility] emergency room.  Notify your Dr. if your fever is 101 or greater.  If the pain medicine your  reccomends does not help you, and you have severe pain call your Dr..   If you cannot reach the doctor, call [facility] Emergency Department at [phone] or go to your local Emergency Department.  A responsible adult should be with you for the rest of the day and night for your safety, and to help you.  Resume your medications as listed on the attached Medication Record.
No

## 2023-12-23 LAB
HCV AB S/CO SERPL IA: 0.04 COI — SIGNIFICANT CHANGE UP
HCV AB S/CO SERPL IA: 0.04 COI — SIGNIFICANT CHANGE UP
HCV AB SERPL-IMP: SIGNIFICANT CHANGE UP
HCV AB SERPL-IMP: SIGNIFICANT CHANGE UP

## 2023-12-23 PROCEDURE — 99232 SBSQ HOSP IP/OBS MODERATE 35: CPT

## 2023-12-23 PROCEDURE — 27245 TREAT THIGH FRACTURE: CPT | Mod: AS,LT

## 2023-12-23 PROCEDURE — 27245 TREAT THIGH FRACTURE: CPT | Mod: LT

## 2023-12-23 PROCEDURE — 71045 X-RAY EXAM CHEST 1 VIEW: CPT | Mod: 26

## 2023-12-23 RX ORDER — MAGNESIUM HYDROXIDE 400 MG/1
30 TABLET, CHEWABLE ORAL DAILY
Refills: 0 | Status: DISCONTINUED | OUTPATIENT
Start: 2023-12-23 | End: 2023-12-25

## 2023-12-23 RX ORDER — CEFAZOLIN SODIUM 1 G
2000 VIAL (EA) INJECTION EVERY 8 HOURS
Refills: 0 | Status: COMPLETED | OUTPATIENT
Start: 2023-12-23 | End: 2023-12-24

## 2023-12-23 RX ORDER — ONDANSETRON 8 MG/1
4 TABLET, FILM COATED ORAL ONCE
Refills: 0 | Status: DISCONTINUED | OUTPATIENT
Start: 2023-12-23 | End: 2023-12-23

## 2023-12-23 RX ORDER — LEVOTHYROXINE SODIUM 125 MCG
125 TABLET ORAL DAILY
Refills: 0 | Status: DISCONTINUED | OUTPATIENT
Start: 2023-12-23 | End: 2023-12-25

## 2023-12-23 RX ORDER — POLYETHYLENE GLYCOL 3350 17 G/17G
17 POWDER, FOR SOLUTION ORAL AT BEDTIME
Refills: 0 | Status: DISCONTINUED | OUTPATIENT
Start: 2023-12-23 | End: 2023-12-25

## 2023-12-23 RX ORDER — KETOROLAC TROMETHAMINE 30 MG/ML
15 SYRINGE (ML) INJECTION EVERY 6 HOURS
Refills: 0 | Status: DISCONTINUED | OUTPATIENT
Start: 2023-12-23 | End: 2023-12-24

## 2023-12-23 RX ORDER — SENNA PLUS 8.6 MG/1
2 TABLET ORAL AT BEDTIME
Refills: 0 | Status: DISCONTINUED | OUTPATIENT
Start: 2023-12-23 | End: 2023-12-25

## 2023-12-23 RX ORDER — METOCLOPRAMIDE HCL 10 MG
10 TABLET ORAL ONCE
Refills: 0 | Status: DISCONTINUED | OUTPATIENT
Start: 2023-12-23 | End: 2023-12-23

## 2023-12-23 RX ORDER — MORPHINE SULFATE 50 MG/1
2 CAPSULE, EXTENDED RELEASE ORAL ONCE
Refills: 0 | Status: DISCONTINUED | OUTPATIENT
Start: 2023-12-23 | End: 2023-12-23

## 2023-12-23 RX ORDER — SODIUM CHLORIDE 9 MG/ML
1000 INJECTION, SOLUTION INTRAVENOUS
Refills: 0 | Status: DISCONTINUED | OUTPATIENT
Start: 2023-12-23 | End: 2023-12-23

## 2023-12-23 RX ORDER — ACETAMINOPHEN 500 MG
650 TABLET ORAL EVERY 6 HOURS
Refills: 0 | Status: DISCONTINUED | OUTPATIENT
Start: 2023-12-23 | End: 2023-12-25

## 2023-12-23 RX ORDER — ENOXAPARIN SODIUM 100 MG/ML
40 INJECTION SUBCUTANEOUS EVERY 24 HOURS
Refills: 0 | Status: DISCONTINUED | OUTPATIENT
Start: 2023-12-23 | End: 2023-12-25

## 2023-12-23 RX ORDER — CHLORHEXIDINE GLUCONATE 213 G/1000ML
1 SOLUTION TOPICAL
Refills: 0 | Status: DISCONTINUED | OUTPATIENT
Start: 2023-12-23 | End: 2023-12-25

## 2023-12-23 RX ORDER — PANTOPRAZOLE SODIUM 20 MG/1
40 TABLET, DELAYED RELEASE ORAL
Refills: 0 | Status: DISCONTINUED | OUTPATIENT
Start: 2023-12-23 | End: 2023-12-25

## 2023-12-23 RX ORDER — TRAMADOL HYDROCHLORIDE 50 MG/1
50 TABLET ORAL EVERY 4 HOURS
Refills: 0 | Status: DISCONTINUED | OUTPATIENT
Start: 2023-12-23 | End: 2023-12-25

## 2023-12-23 RX ORDER — MORPHINE SULFATE 50 MG/1
4 CAPSULE, EXTENDED RELEASE ORAL ONCE
Refills: 0 | Status: DISCONTINUED | OUTPATIENT
Start: 2023-12-23 | End: 2023-12-23

## 2023-12-23 RX ORDER — CELECOXIB 200 MG/1
200 CAPSULE ORAL EVERY 12 HOURS
Refills: 0 | Status: DISCONTINUED | OUTPATIENT
Start: 2023-12-24 | End: 2023-12-25

## 2023-12-23 RX ORDER — HYDROMORPHONE HYDROCHLORIDE 2 MG/ML
0.5 INJECTION INTRAMUSCULAR; INTRAVENOUS; SUBCUTANEOUS
Refills: 0 | Status: DISCONTINUED | OUTPATIENT
Start: 2023-12-23 | End: 2023-12-23

## 2023-12-23 RX ADMIN — Medication 15 MILLIGRAM(S): at 23:03

## 2023-12-23 RX ADMIN — Medication 650 MILLIGRAM(S): at 23:03

## 2023-12-23 RX ADMIN — HYDROMORPHONE HYDROCHLORIDE 0.5 MILLIGRAM(S): 2 INJECTION INTRAMUSCULAR; INTRAVENOUS; SUBCUTANEOUS at 16:06

## 2023-12-23 RX ADMIN — SENNA PLUS 2 TABLET(S): 8.6 TABLET ORAL at 23:03

## 2023-12-23 RX ADMIN — SODIUM CHLORIDE 100 MILLILITER(S): 9 INJECTION, SOLUTION INTRAVENOUS at 11:05

## 2023-12-23 RX ADMIN — Medication 15 MILLIGRAM(S): at 16:30

## 2023-12-23 RX ADMIN — MORPHINE SULFATE 2 MILLIGRAM(S): 50 CAPSULE, EXTENDED RELEASE ORAL at 05:17

## 2023-12-23 RX ADMIN — Medication 15 MILLIGRAM(S): at 17:01

## 2023-12-23 RX ADMIN — TRAMADOL HYDROCHLORIDE 50 MILLIGRAM(S): 50 TABLET ORAL at 16:02

## 2023-12-23 RX ADMIN — Medication 100 MILLIGRAM(S): at 22:57

## 2023-12-23 RX ADMIN — TRAMADOL HYDROCHLORIDE 50 MILLIGRAM(S): 50 TABLET ORAL at 17:00

## 2023-12-23 RX ADMIN — MORPHINE SULFATE 2 MILLIGRAM(S): 50 CAPSULE, EXTENDED RELEASE ORAL at 02:32

## 2023-12-23 RX ADMIN — HYDROMORPHONE HYDROCHLORIDE 0.5 MILLIGRAM(S): 2 INJECTION INTRAMUSCULAR; INTRAVENOUS; SUBCUTANEOUS at 13:37

## 2023-12-23 RX ADMIN — MORPHINE SULFATE 2 MILLIGRAM(S): 50 CAPSULE, EXTENDED RELEASE ORAL at 02:50

## 2023-12-23 RX ADMIN — Medication 125 MICROGRAM(S): at 17:48

## 2023-12-23 NOTE — OCCUPATIONAL THERAPY INITIAL EVALUATION ADULT - LIVES WITH, PROFILE
in a private home +8-9 steps to enter with wide bilateral handrails +11 steps with left handrail to bedroom +1 flight of steps with left handrail to basement +tub +walk-in-shower in basement +low toilet/spouse

## 2023-12-23 NOTE — OCCUPATIONAL THERAPY INITIAL EVALUATION ADULT - PERTINENT HX OF CURRENT PROBLEM, REHAB EVAL
69 yo F accompanied by her  and son at bed side. Pt presents to ER for fall, occurred today, left sided, c/o left hip pain. Pt states that she was outside wearing slippers when she turned abruptly and fell on her behind. Pt with a pmh of GERD,  hypothyroidism, shortened left leg.     X-ray pelvis 12/22: There is acute comminuted proximal humeral shaft fracture with extension into the lesser trochanter, demonstrating 1.4 cm medial displacement of the femoral shaft from the femoral neck. There are moderate degenerative changes of the bilateral hips and sacroiliac joints.    s/p left hip ORIF using Gamma nail POD#0; general anesthesia

## 2023-12-23 NOTE — OCCUPATIONAL THERAPY INITIAL EVALUATION ADULT - LEVEL OF CONSCIOUSNESS, OT EVAL
Medication Dose Route Frequency Provider Last Rate Last Dose    promethazine (PHENERGAN) injection 25 mg  25 mg Intramuscular Q6H PRN Nithya Balderas MD   25 mg at 12/17/14 1156                   Objective:           Physical Exam  Vitals signs and nursing note reviewed. Constitutional:       Appearance: She is well-developed. HENT:      Head: Normocephalic and atraumatic. Neck:      Musculoskeletal: Neck supple. Thyroid: No thyromegaly. Trachea: No tracheal deviation. Chest:      Breasts: Breasts are symmetrical.         Right: No inverted nipple, mass, nipple discharge, skin change or tenderness. Left: Mass present. No inverted nipple, nipple discharge, skin change or tenderness. Skin:     General: Skin is warm and dry. Neurological:      Mental Status: She is alert. Psychiatric:         Speech: Speech normal.         Behavior: Behavior normal.         Thought Content: Thought content normal.         Judgment: Judgment normal.       Vitals:    02/13/20 1503   BP: 122/80   Site: Right Upper Arm   Position: Sitting   Cuff Size: Medium Adult   Pulse: 62   Weight: 178 lb 3.2 oz (80.8 kg)   Height: 5' 4\" (1.626 m)     Body mass index is 30.59 kg/m². Wt Readings from Last 3 Encounters:   02/13/20 178 lb 3.2 oz (80.8 kg)   01/29/20 178 lb (80.7 kg)   08/07/19 176 lb 6.4 oz (80 kg)     BP Readings from Last 3 Encounters:   02/13/20 122/80   01/29/20 122/76   08/07/19 122/84          No results found for this visit on 02/13/20. Assessment:       Diagnosis Orders   1. Breast mass, left  [facility] DIGITAL DIAGNOSTIC AUGMENTED BILATERAL   2. Mastodynia   [facility] DIGITAL DIAGNOSTIC AUGMENTED BILATERAL           Plan:      Patient had appt for mammo for next Wednesday, which was the earliest that was available. No follow-ups on file. alert

## 2023-12-23 NOTE — PHYSICAL THERAPY INITIAL EVALUATION ADULT - IMPAIRED TRANSFERS: SIT/STAND, REHAB EVAL
pt became orthostatic during sit to stand and had to return back to bed/impaired balance/pain/impaired postural control/decreased strength

## 2023-12-23 NOTE — OCCUPATIONAL THERAPY INITIAL EVALUATION ADULT - LEVEL OF INDEPENDENCE: BED TO CHAIR, REHAB EVAL
Not performed. Pt reported she felt lightheaded following sit<>stand. BP taken. Pt assisted back to bed and left semifowler in NAD. TBA.

## 2023-12-23 NOTE — PHYSICAL THERAPY INITIAL EVALUATION ADULT - PERTINENT HX OF CURRENT PROBLEM, REHAB EVAL
69 yo female presents to er for fall, occurred today, left sided, c/o left hip pain.  Left hip FX/ acute comminuted proximal femoral shaft fracture:  s/p LTHA

## 2023-12-23 NOTE — PHYSICAL THERAPY INITIAL EVALUATION ADULT - NAME OF DISCHARGE PLANNER
Please let the patient know that her pelvic ultrasound is stable.  She has a small right ovarian cyst and she has a slight thickened, uterine lining which can be seen just prior to having her menstrual period.  Not sure if she had her menses after the ultrasound?  Uterus is normal size.    She could be having perimenopausal symptoms with uterine cramping or discomfort from the right ovarian cyst.  If she has continued to have symptoms, I would refer to gynecology for further evaluation.    She is due to have a colonoscopy which would look at bowel and she is also due for a Pap test.  I would recommend that she have both of these as general screening.    Follow-up at the clinic if she continues to have concerns or is interested in doing a physical/Pap.  Please send referral for screening colonoscopy if she is interested.   Fay - care coordinator

## 2023-12-23 NOTE — PROGRESS NOTE ADULT - SUBJECTIVE AND OBJECTIVE BOX
SUBJECTIVE:    Patient is a 68y old Female who presents with a chief complaint of fall/ left hip FX (22 Dec 2023 20:34)    Currently admitted to medicine with the primary diagnosis of Hip fracture, left       Today is hospital day 1d. This morning she is resting comfortably in bed and reports no new issues or overnight events.     ROS:   CONSTITUTIONAL: No weakness, fevers or chills   EYES/ENT: No visual changes; No vertigo or throat pain   NECK: No pain or stiffness   RESPIRATORY: No cough, wheezing, hemoptysis; No shortness of breath   CARDIOVASCULAR: No chest pain or palpitations   GASTROINTESTINAL: No abdominal or epigastric pain. No nausea, vomiting, or hematemesis; No diarrhea or constipation. No melena or hematochezia.  GENITOURINARY: No dysuria, frequency or hematuria  NEUROLOGICAL: No numbness or weakness  SKIN: No itching, rashes      PAST MEDICAL & SURGICAL HISTORY  GERD (gastroesophageal reflux disease)    Former smoker    Anemia  last iron infusion 8/2022    Hypothyroidism  as per pt- i had all my thyroid levels drawn today 10/18/22 at my [facility]    H/O: obesity  bmi-34.7%    H/O partial thyroidectomy  THYROID NODULE    H/O gastric bypass      SOCIAL HISTORY:    ALLERGIES:  No Known Allergies    MEDICATIONS:  STANDING MEDICATIONS  acetaminophen     Tablet .. 650 milliGRAM(s) Oral every 6 hours  ceFAZolin   IVPB 2000 milliGRAM(s) IV Intermittent every 8 hours  chlorhexidine 2% Cloths 1 Application(s) Topical <User Schedule>  enoxaparin Injectable 40 milliGRAM(s) SubCutaneous every 24 hours  influenza  Vaccine (HIGH DOSE) 0.7 milliLiter(s) IntraMuscular once  ketorolac   Injectable 15 milliGRAM(s) IV Push every 6 hours  levothyroxine 125 MICROGram(s) Oral daily  pantoprazole    Tablet 40 milliGRAM(s) Oral before breakfast  polyethylene glycol 3350 17 Gram(s) Oral at bedtime  senna 2 Tablet(s) Oral at bedtime    PRN MEDICATIONS  aluminum hydroxide/magnesium hydroxide/simethicone Suspension 30 milliLiter(s) Oral four times a day PRN  magnesium hydroxide Suspension 30 milliLiter(s) Oral daily PRN  traMADol 50 milliGRAM(s) Oral every 4 hours PRN    VITALS:   T(F): 96.3  HR: 76  BP: 129/59  RR: 16  SpO2: 97%    LABS:  Negative for smoking/alcohol/drug use.                         11.4   7.54  )-----------( 311      ( 22 Dec 2023 19:00 )             35.8     12-22    138  |  103  |  19  ----------------------------<  133<H>  4.1   |  25  |  0.7    Ca    8.9      22 Dec 2023 19:00    TPro  5.8<L>  /  Alb  3.9  /  TBili  0.3  /  DBili  x   /  AST  22  /  ALT  15  /  AlkPhos  114  12-22      Urinalysis Basic - ( 22 Dec 2023 19:00 )    Color: x / Appearance: x / SG: x / pH: x  Gluc: 133 mg/dL / Ketone: x  / Bili: x / Urobili: x   Blood: x / Protein: x / Nitrite: x   Leuk Esterase: x / RBC: x / WBC x   Sq Epi: x / Non Sq Epi: x / Bacteria: x    RADIOLOGY:    PHYSICAL EXAM:  GEN: No acute distress  HEENT: normocephalic, atraumatic, aniceteric  LUNGS: bl breath sounds   HEART: S1/S2 present. RRR, no murmurs  ABD: Soft, non-tender, non-distended. Bowel sounds present  EXT: warm   NEURO: AAOX3, normal affect      ASSESSMENT AND PLAN:    67 YO F with PMH of hypothyroidism presents to er for fall, occurred today, left sided, c/o left hip pain.    # Left hip FX/ acute comminuted proximal femoral shaft fracture sp OR 12/23  - PT /OT eval   - DVT PPX  - Pain control  - Diet   - Ortho following     # H/O Hypothyroid- synthroid 250mcg po QD    # dvt/gi ppx/diet  # dispo: acute  # handoff: postop today, pending pt/ot ambulate          SUBJECTIVE:    Patient is a 68y old Female who presents with a chief complaint of fall/ left hip FX (22 Dec 2023 20:34)    Currently admitted to medicine with the primary diagnosis of Hip fracture, left       Today is hospital day 1d. This morning she is resting comfortably in bed and reports no new issues or overnight events.     ROS:   CONSTITUTIONAL: No weakness, fevers or chills   EYES/ENT: No visual changes; No vertigo or throat pain   NECK: No pain or stiffness   RESPIRATORY: No cough, wheezing, hemoptysis; No shortness of breath   CARDIOVASCULAR: No chest pain or palpitations   GASTROINTESTINAL: No abdominal or epigastric pain. No nausea, vomiting, or hematemesis; No diarrhea or constipation. No melena or hematochezia.  GENITOURINARY: No dysuria, frequency or hematuria  NEUROLOGICAL: No numbness or weakness  SKIN: No itching, rashes      PAST MEDICAL & SURGICAL HISTORY  GERD (gastroesophageal reflux disease)    Former smoker    Anemia  last iron infusion 8/2022    Hypothyroidism  as per pt- i had all my thyroid levels drawn today 10/18/22 at my [facility]    H/O: obesity  bmi-34.7%    H/O partial thyroidectomy  THYROID NODULE    H/O gastric bypass      SOCIAL HISTORY:    ALLERGIES:  No Known Allergies    MEDICATIONS:  STANDING MEDICATIONS  acetaminophen     Tablet .. 650 milliGRAM(s) Oral every 6 hours  ceFAZolin   IVPB 2000 milliGRAM(s) IV Intermittent every 8 hours  chlorhexidine 2% Cloths 1 Application(s) Topical <User Schedule>  enoxaparin Injectable 40 milliGRAM(s) SubCutaneous every 24 hours  influenza  Vaccine (HIGH DOSE) 0.7 milliLiter(s) IntraMuscular once  ketorolac   Injectable 15 milliGRAM(s) IV Push every 6 hours  levothyroxine 125 MICROGram(s) Oral daily  pantoprazole    Tablet 40 milliGRAM(s) Oral before breakfast  polyethylene glycol 3350 17 Gram(s) Oral at bedtime  senna 2 Tablet(s) Oral at bedtime    PRN MEDICATIONS  aluminum hydroxide/magnesium hydroxide/simethicone Suspension 30 milliLiter(s) Oral four times a day PRN  magnesium hydroxide Suspension 30 milliLiter(s) Oral daily PRN  traMADol 50 milliGRAM(s) Oral every 4 hours PRN    VITALS:   T(F): 96.3  HR: 76  BP: 129/59  RR: 16  SpO2: 97%    LABS:  Negative for smoking/alcohol/drug use.                         11.4   7.54  )-----------( 311      ( 22 Dec 2023 19:00 )             35.8     12-22    138  |  103  |  19  ----------------------------<  133<H>  4.1   |  25  |  0.7    Ca    8.9      22 Dec 2023 19:00    TPro  5.8<L>  /  Alb  3.9  /  TBili  0.3  /  DBili  x   /  AST  22  /  ALT  15  /  AlkPhos  114  12-22      Urinalysis Basic - ( 22 Dec 2023 19:00 )    Color: x / Appearance: x / SG: x / pH: x  Gluc: 133 mg/dL / Ketone: x  / Bili: x / Urobili: x   Blood: x / Protein: x / Nitrite: x   Leuk Esterase: x / RBC: x / WBC x   Sq Epi: x / Non Sq Epi: x / Bacteria: x    RADIOLOGY:    PHYSICAL EXAM:  GEN: No acute distress  HEENT: normocephalic, atraumatic, aniceteric  LUNGS: bl breath sounds   HEART: S1/S2 present. RRR, no murmurs  ABD: Soft, non-tender, non-distended. Bowel sounds present  EXT: warm   NEURO: AAOX3, normal affect      ASSESSMENT AND PLAN:    69 YO F with PMH of hypothyroidism presents to er for fall, occurred today, left sided, c/o left hip pain.    # Left hip FX/ acute comminuted proximal femoral shaft fracture sp OR 12/23  - PT /OT eval   - DVT PPX  - Pain control  - Diet   - Ortho following     # H/O Hypothyroid- synthroid 250mcg po QD    # dvt/gi ppx/diet  # dispo: acute  # handoff: postop today, pending pt/ot ambulate          SUBJECTIVE:    Patient is a 68y old Female who presents with a chief complaint of fall/ left hip FX (22 Dec 2023 20:34)    Currently admitted to medicine with the primary diagnosis of Hip fracture, left       Today is hospital day 1d. This morning she is resting comfortably in bed and reports no new issues or overnight events.     ROS:   CONSTITUTIONAL: No weakness, fevers or chills   EYES/ENT: No visual changes; No vertigo or throat pain   NECK: No pain or stiffness   RESPIRATORY: No cough, wheezing, hemoptysis; No shortness of breath   CARDIOVASCULAR: No chest pain or palpitations   GASTROINTESTINAL: No abdominal or epigastric pain. No nausea, vomiting, or hematemesis; No diarrhea or constipation. No melena or hematochezia.  GENITOURINARY: No dysuria, frequency or hematuria  NEUROLOGICAL: No numbness or weakness  SKIN: No itching, rashes      PAST MEDICAL & SURGICAL HISTORY  GERD (gastroesophageal reflux disease)    Former smoker    Anemia  last iron infusion 8/2022    Hypothyroidism  as per pt- i had all my thyroid levels drawn today 10/18/22 at my [facility]    H/O: obesity  bmi-34.7%    H/O partial thyroidectomy  THYROID NODULE    H/O gastric bypass      SOCIAL HISTORY:    ALLERGIES:  No Known Allergies    MEDICATIONS:  STANDING MEDICATIONS  acetaminophen     Tablet .. 650 milliGRAM(s) Oral every 6 hours  ceFAZolin   IVPB 2000 milliGRAM(s) IV Intermittent every 8 hours  chlorhexidine 2% Cloths 1 Application(s) Topical <User Schedule>  enoxaparin Injectable 40 milliGRAM(s) SubCutaneous every 24 hours  influenza  Vaccine (HIGH DOSE) 0.7 milliLiter(s) IntraMuscular once  ketorolac   Injectable 15 milliGRAM(s) IV Push every 6 hours  levothyroxine 125 MICROGram(s) Oral daily  pantoprazole    Tablet 40 milliGRAM(s) Oral before breakfast  polyethylene glycol 3350 17 Gram(s) Oral at bedtime  senna 2 Tablet(s) Oral at bedtime    PRN MEDICATIONS  aluminum hydroxide/magnesium hydroxide/simethicone Suspension 30 milliLiter(s) Oral four times a day PRN  magnesium hydroxide Suspension 30 milliLiter(s) Oral daily PRN  traMADol 50 milliGRAM(s) Oral every 4 hours PRN    VITALS:   T(F): 96.3  HR: 76  BP: 129/59  RR: 16  SpO2: 97%    LABS:  Negative for smoking/alcohol/drug use.                         11.4   7.54  )-----------( 311      ( 22 Dec 2023 19:00 )             35.8     12-22    138  |  103  |  19  ----------------------------<  133<H>  4.1   |  25  |  0.7    Ca    8.9      22 Dec 2023 19:00    TPro  5.8<L>  /  Alb  3.9  /  TBili  0.3  /  DBili  x   /  AST  22  /  ALT  15  /  AlkPhos  114  12-22      Urinalysis Basic - ( 22 Dec 2023 19:00 )    Color: x / Appearance: x / SG: x / pH: x  Gluc: 133 mg/dL / Ketone: x  / Bili: x / Urobili: x   Blood: x / Protein: x / Nitrite: x   Leuk Esterase: x / RBC: x / WBC x   Sq Epi: x / Non Sq Epi: x / Bacteria: x    RADIOLOGY:    PHYSICAL EXAM:  GEN: No acute distress  HEENT: normocephalic, atraumatic, aniceteric  LUNGS: bl breath sounds   HEART: S1/S2 present. RRR, no murmurs  ABD: Soft, non-tender, non-distended. Bowel sounds present  EXT: warm   NEURO: AAOX3, normal affect      ASSESSMENT AND PLAN:    69 YO F with PMH of hypothyroidism presents to er for fall, occurred today, left sided, c/o left hip pain.    # Left hip FX/ acute comminuted proximal femoral shaft fracture sp OR 12/23  - PT /OT eval   - DVT PPX  - Pain control  - Diet   - Ortho following     # Post-op urinary retention , lynn inserted in pacu , will attempt TOV when patient ambulating     # H/O Hypothyroid- synthroid 250mcg po QD    # dvt/gi ppx/diet  # dispo: acute  # handoff: postop today, pending pt/ot ambulate          SUBJECTIVE:    Patient is a 68y old Female who presents with a chief complaint of fall/ left hip FX (22 Dec 2023 20:34)    Currently admitted to medicine with the primary diagnosis of Hip fracture, left       Today is hospital day 1d. This morning she is resting comfortably in bed and reports no new issues or overnight events.     ROS:   CONSTITUTIONAL: No weakness, fevers or chills   EYES/ENT: No visual changes; No vertigo or throat pain   NECK: No pain or stiffness   RESPIRATORY: No cough, wheezing, hemoptysis; No shortness of breath   CARDIOVASCULAR: No chest pain or palpitations   GASTROINTESTINAL: No abdominal or epigastric pain. No nausea, vomiting, or hematemesis; No diarrhea or constipation. No melena or hematochezia.  GENITOURINARY: No dysuria, frequency or hematuria  NEUROLOGICAL: No numbness or weakness  SKIN: No itching, rashes      PAST MEDICAL & SURGICAL HISTORY  GERD (gastroesophageal reflux disease)    Former smoker    Anemia  last iron infusion 8/2022    Hypothyroidism  as per pt- i had all my thyroid levels drawn today 10/18/22 at my [facility]    H/O: obesity  bmi-34.7%    H/O partial thyroidectomy  THYROID NODULE    H/O gastric bypass      SOCIAL HISTORY:    ALLERGIES:  No Known Allergies    MEDICATIONS:  STANDING MEDICATIONS  acetaminophen     Tablet .. 650 milliGRAM(s) Oral every 6 hours  ceFAZolin   IVPB 2000 milliGRAM(s) IV Intermittent every 8 hours  chlorhexidine 2% Cloths 1 Application(s) Topical <User Schedule>  enoxaparin Injectable 40 milliGRAM(s) SubCutaneous every 24 hours  influenza  Vaccine (HIGH DOSE) 0.7 milliLiter(s) IntraMuscular once  ketorolac   Injectable 15 milliGRAM(s) IV Push every 6 hours  levothyroxine 125 MICROGram(s) Oral daily  pantoprazole    Tablet 40 milliGRAM(s) Oral before breakfast  polyethylene glycol 3350 17 Gram(s) Oral at bedtime  senna 2 Tablet(s) Oral at bedtime    PRN MEDICATIONS  aluminum hydroxide/magnesium hydroxide/simethicone Suspension 30 milliLiter(s) Oral four times a day PRN  magnesium hydroxide Suspension 30 milliLiter(s) Oral daily PRN  traMADol 50 milliGRAM(s) Oral every 4 hours PRN    VITALS:   T(F): 96.3  HR: 76  BP: 129/59  RR: 16  SpO2: 97%    LABS:  Negative for smoking/alcohol/drug use.                         11.4   7.54  )-----------( 311      ( 22 Dec 2023 19:00 )             35.8     12-22    138  |  103  |  19  ----------------------------<  133<H>  4.1   |  25  |  0.7    Ca    8.9      22 Dec 2023 19:00    TPro  5.8<L>  /  Alb  3.9  /  TBili  0.3  /  DBili  x   /  AST  22  /  ALT  15  /  AlkPhos  114  12-22      Urinalysis Basic - ( 22 Dec 2023 19:00 )    Color: x / Appearance: x / SG: x / pH: x  Gluc: 133 mg/dL / Ketone: x  / Bili: x / Urobili: x   Blood: x / Protein: x / Nitrite: x   Leuk Esterase: x / RBC: x / WBC x   Sq Epi: x / Non Sq Epi: x / Bacteria: x    RADIOLOGY:    PHYSICAL EXAM:  GEN: No acute distress  HEENT: normocephalic, atraumatic, aniceteric  LUNGS: bl breath sounds   HEART: S1/S2 present. RRR, no murmurs  ABD: Soft, non-tender, non-distended. Bowel sounds present  EXT: warm   NEURO: AAOX3, normal affect      ASSESSMENT AND PLAN:    67 YO F with PMH of hypothyroidism presents to er for fall, occurred today, left sided, c/o left hip pain.    # Left hip FX/ acute comminuted proximal femoral shaft fracture sp OR 12/23  - PT /OT eval   - DVT PPX  - Pain control  - Diet   - Ortho following     # Post-op urinary retention , lynn inserted in pacu , will attempt TOV when patient ambulating     # H/O Hypothyroid- synthroid 250mcg po QD    # dvt/gi ppx/diet  # dispo: acute  # handoff: postop today, pending pt/ot ambulate

## 2023-12-23 NOTE — PHYSICAL THERAPY INITIAL EVALUATION ADULT - GENERAL OBSERVATIONS, REHAB EVAL
16;00-16;30 pt was seen for PT IE at bed side, pt is agreeable, chart thoroughly reviewed, RN Adriel is aware.  Pt received and left semi beltran in bed, in no apparent distress, +hep lock, +aquacell dressing in dry and intact L hip, +call bell within reach, bed side table at reach,  is at bed side, OT Lory was present during the IE

## 2023-12-23 NOTE — OCCUPATIONAL THERAPY INITIAL EVALUATION ADULT - IMPAIRMENTS CONTRIBUTING IMPAIRED BED MOBILITY, REHAB EVAL
8/10 left hip and groin/impaired balance/pain/impaired postural control/decreased ROM/decreased strength

## 2023-12-23 NOTE — OCCUPATIONAL THERAPY INITIAL EVALUATION ADULT - FINE MOTOR COORDINATION, LEFT HAND, FINGER TO NOSE, OT EVAL
Lin is a 42 year old who is being evaluated via a billable telephone visit.      What phone number would you like to be contacted at? [phone] How would you like to obtain your AVS? MyChart    Assessment & Plan     Anxiety    Will continue with present dose of Citalopram, start counseling and increase use of atarax as needed during difficult time.             See Patient Instructions  Patient Instructions   Try the Atarax 25 mg up to three times a day as needed and can take 50 mg at bedtime.  Continue with Citalopram and counseling.  Notify me if any further concerns with anxiety.      Our Clinic hours are:  Mondays    7:20 am - 7 pm  Tues -  Fri  7:20 am - 5 pm    Clinic Phone: [phone]    The clinic lab opens at 7:30 am Mon - Fri and appointments are required.    [facility]. [phone]  Monday  8 am - 7pm  Tues - Fri 8 am - 5:30 pm           No follow-ups on file.    [facility]    Jennifer Ceballos is a 42 year old who presents for the following health issues     HPI     Anxiety Follow-Up    How are you doing with your anxiety since your last visit? Worsened     Are you having other symptoms that might be associated with anxiety? No    Have you had a significant life event? Health Concerns     Are you feeling depressed? Yes     Do you have any concerns with your use of alcohol or other drugs? No    Social History     Tobacco Use     Smoking status: Former Smoker     Packs/day: 0.00     Smokeless tobacco: Never Used   Vaping Use     Vaping Use: Never used   Substance Use Topics     Alcohol use: Yes     Comment: occassional     Drug use: No     LOS-7 SCORE 11/22/2021 12/21/2021 4/12/2022   Total Score 7 6 5     PHQ 11/22/2021 12/21/2021 4/12/2022   PHQ-9 Total Score 4 7 5   Q9: Thoughts of better off dead/self-harm past 2 weeks Not at all Not at all Not at all     Last PHQ-9 4/12/2022   1.  Little interest or pleasure  in doing things 0   2.  Feeling down, depressed, or hopeless 1   3.  Trouble falling or staying asleep, or sleeping too much 1   4.  Feeling tired or having little energy 1   5.  Poor appetite or overeating 0   6.  Feeling bad about yourself 0   7.  Trouble concentrating 1   8.  Moving slowly or restless 1   Q9: Thoughts of better off dead/self-harm past 2 weeks 0   PHQ-9 Total Score 5   Difficulty at work, home, or with people Somewhat difficult     LOS-7  4/12/2022   1. Feeling nervous, anxious, or on edge 1   2. Not being able to stop or control worrying 1   3. Worrying too much about different things 1   4. Trouble relaxing 1   5. Being so restless that it is hard to sit still 0   6. Becoming easily annoyed or irritable 1   7. Feeling afraid, as if something awful might happen 0   LOS-7 Total Score 5   If you checked any problems, how difficult have they made it for you to do your work, take care of things at home, or get along with other people? Somewhat difficult         How many servings of fruits and vegetables do you eat daily?  4 or more    On average, how many sweetened beverages do you drink each day (Examples: soda, juice, sweet tea, etc.  Do NOT count diet or artificially sweetened beverages)?   0    How many days per week do you exercise enough to make your heart beat faster? 3 or less    How many minutes a day do you exercise enough to make your heart beat faster? 20 - 29    How many days per week do you miss taking your medication? 0     is in the hospital with a rare auto immune disorder and she is very stressed out. Feels she's managing ok. Will start counseling today.        Review of Systems   Constitutional, HEENT, cardiovascular, pulmonary, gi and gu systems are negative, except as otherwise noted.      Objective           Vitals:  No vitals were obtained today due to virtual visit.    Physical Exam   healthy, alert and no distress  PSYCH: Alert and oriented times 3; coherent speech,  normal   rate and volume, able to articulate logical thoughts, able   to abstract reason, no tangential thoughts, no hallucinations   or delusions  Her affect is normal  RESP: No cough, no audible wheezing, able to talk in full sentences  Remainder of exam unable to be completed due to telephone visits                Phone call duration: 12 minutes   normal performance

## 2023-12-23 NOTE — BRIEF OPERATIVE NOTE - COMMENTS
[facility]amma [facility] [id number]   2 distal locking screws e[facility]amma nail [id number]   2 distal locking screws

## 2023-12-23 NOTE — PHYSICAL THERAPY INITIAL EVALUATION ADULT - SIT-TO-STAND BALANCE
with RW/poor balance Doxycycline Counseling:  Patient counseled regarding possible photosensitivity and increased risk for sunburn.  Patient instructed to avoid sunlight, if possible.  When exposed to sunlight, patients should wear protective clothing, sunglasses, and sunscreen.  The patient was instructed to call the office immediately if the following severe adverse effects occur:  hearing changes, easy bruising/bleeding, severe headache, or vision changes.  The patient verbalized understanding of the proper use and possible adverse effects of doxycycline.  All of the patient's questions and concerns were addressed.

## 2023-12-23 NOTE — OCCUPATIONAL THERAPY INITIAL EVALUATION ADULT - GENERAL OBSERVATIONS, REHAB EVAL
15:55-16:20; chart reviewed, ok to treat by Occupational Therapist as confirmed by RN Adriel, Pt received semifowler +surgical dressing left hip +lynn (IV disconnected by RN) in NAD. PT Rebecca present for part of IE. Pt reported 8/10 left hip and groin pain; RN aware. Pt in agreement with OT IE. BP while seated at /57; BP while standing 71/43.

## 2023-12-23 NOTE — PHYSICAL THERAPY INITIAL EVALUATION ADULT - ADDITIONAL COMMENTS
pt lives with her  in a private house with 4-5 steps to enter and 16 steps to the bedroom and bathroom area with rails.  Pt was independent community ambulator prior to admission without AD, pt was an active  prior to admission pt lives with her  in a private house with 8-9 steps to enter and 11 steps to the bedroom and bathroom area with rails.  Pt was independent community ambulator prior to admission without AD, pt was an active  prior to admission

## 2023-12-24 ENCOUNTER — TRANSCRIPTION ENCOUNTER (OUTPATIENT)
Age: 68
End: 2023-12-24

## 2023-12-24 DIAGNOSIS — E03.9 HYPOTHYROIDISM, UNSPECIFIED: ICD-10-CM

## 2023-12-24 LAB
ALBUMIN SERPL ELPH-MCNC: 3.3 G/DL — LOW (ref 3.5–5.2)
ALBUMIN SERPL ELPH-MCNC: 3.3 G/DL — LOW (ref 3.5–5.2)
ALP SERPL-CCNC: 79 U/L — SIGNIFICANT CHANGE UP (ref 30–115)
ALP SERPL-CCNC: 79 U/L — SIGNIFICANT CHANGE UP (ref 30–115)
ALT FLD-CCNC: 14 U/L — SIGNIFICANT CHANGE UP (ref 0–41)
ALT FLD-CCNC: 14 U/L — SIGNIFICANT CHANGE UP (ref 0–41)
ANION GAP SERPL CALC-SCNC: 8 MMOL/L — SIGNIFICANT CHANGE UP (ref 7–14)
ANION GAP SERPL CALC-SCNC: 8 MMOL/L — SIGNIFICANT CHANGE UP (ref 7–14)
AST SERPL-CCNC: 28 U/L — SIGNIFICANT CHANGE UP (ref 0–41)
AST SERPL-CCNC: 28 U/L — SIGNIFICANT CHANGE UP (ref 0–41)
BASOPHILS # BLD AUTO: 0.01 K/UL — SIGNIFICANT CHANGE UP (ref 0–0.2)
BASOPHILS # BLD AUTO: 0.01 K/UL — SIGNIFICANT CHANGE UP (ref 0–0.2)
BASOPHILS NFR BLD AUTO: 0.2 % — SIGNIFICANT CHANGE UP (ref 0–1)
BASOPHILS NFR BLD AUTO: 0.2 % — SIGNIFICANT CHANGE UP (ref 0–1)
BILIRUB SERPL-MCNC: 0.3 MG/DL — SIGNIFICANT CHANGE UP (ref 0.2–1.2)
BILIRUB SERPL-MCNC: 0.3 MG/DL — SIGNIFICANT CHANGE UP (ref 0.2–1.2)
BUN SERPL-MCNC: 17 MG/DL — SIGNIFICANT CHANGE UP (ref 10–20)
BUN SERPL-MCNC: 17 MG/DL — SIGNIFICANT CHANGE UP (ref 10–20)
CALCIUM SERPL-MCNC: 7.6 MG/DL — LOW (ref 8.4–10.5)
CALCIUM SERPL-MCNC: 7.6 MG/DL — LOW (ref 8.4–10.5)
CHLORIDE SERPL-SCNC: 102 MMOL/L — SIGNIFICANT CHANGE UP (ref 98–110)
CHLORIDE SERPL-SCNC: 102 MMOL/L — SIGNIFICANT CHANGE UP (ref 98–110)
CO2 SERPL-SCNC: 28 MMOL/L — SIGNIFICANT CHANGE UP (ref 17–32)
CO2 SERPL-SCNC: 28 MMOL/L — SIGNIFICANT CHANGE UP (ref 17–32)
CREAT SERPL-MCNC: 0.7 MG/DL — SIGNIFICANT CHANGE UP (ref 0.7–1.5)
CREAT SERPL-MCNC: 0.7 MG/DL — SIGNIFICANT CHANGE UP (ref 0.7–1.5)
EGFR: 95 ML/MIN/1.73M2 — SIGNIFICANT CHANGE UP
EGFR: 95 ML/MIN/1.73M2 — SIGNIFICANT CHANGE UP
EOSINOPHIL # BLD AUTO: 0.02 K/UL — SIGNIFICANT CHANGE UP (ref 0–0.7)
EOSINOPHIL # BLD AUTO: 0.02 K/UL — SIGNIFICANT CHANGE UP (ref 0–0.7)
EOSINOPHIL NFR BLD AUTO: 0.4 % — SIGNIFICANT CHANGE UP (ref 0–8)
EOSINOPHIL NFR BLD AUTO: 0.4 % — SIGNIFICANT CHANGE UP (ref 0–8)
GLUCOSE SERPL-MCNC: 114 MG/DL — HIGH (ref 70–99)
GLUCOSE SERPL-MCNC: 114 MG/DL — HIGH (ref 70–99)
HCT VFR BLD CALC: 25.3 % — LOW (ref 37–47)
HCT VFR BLD CALC: 25.3 % — LOW (ref 37–47)
HCT VFR BLD CALC: 26.8 % — LOW (ref 37–47)
HCT VFR BLD CALC: 26.8 % — LOW (ref 37–47)
HGB BLD-MCNC: 8.6 G/DL — LOW (ref 12–16)
HGB BLD-MCNC: 8.6 G/DL — LOW (ref 12–16)
HGB BLD-MCNC: 8.8 G/DL — LOW (ref 12–16)
HGB BLD-MCNC: 8.8 G/DL — LOW (ref 12–16)
IMM GRANULOCYTES NFR BLD AUTO: 0.2 % — SIGNIFICANT CHANGE UP (ref 0.1–0.3)
IMM GRANULOCYTES NFR BLD AUTO: 0.2 % — SIGNIFICANT CHANGE UP (ref 0.1–0.3)
LYMPHOCYTES # BLD AUTO: 1.11 K/UL — LOW (ref 1.2–3.4)
LYMPHOCYTES # BLD AUTO: 1.11 K/UL — LOW (ref 1.2–3.4)
LYMPHOCYTES # BLD AUTO: 20.1 % — LOW (ref 20.5–51.1)
LYMPHOCYTES # BLD AUTO: 20.1 % — LOW (ref 20.5–51.1)
MCHC RBC-ENTMCNC: 29.3 PG — SIGNIFICANT CHANGE UP (ref 27–31)
MCHC RBC-ENTMCNC: 29.3 PG — SIGNIFICANT CHANGE UP (ref 27–31)
MCHC RBC-ENTMCNC: 29.6 PG — SIGNIFICANT CHANGE UP (ref 27–31)
MCHC RBC-ENTMCNC: 29.6 PG — SIGNIFICANT CHANGE UP (ref 27–31)
MCHC RBC-ENTMCNC: 31.8 G/DL — LOW (ref 32–37)
MCHC RBC-ENTMCNC: 31.8 G/DL — LOW (ref 32–37)
MCHC RBC-ENTMCNC: 32.8 G/DL — SIGNIFICANT CHANGE UP (ref 32–37)
MCHC RBC-ENTMCNC: 32.8 G/DL — SIGNIFICANT CHANGE UP (ref 32–37)
MCV RBC AUTO: 90.2 FL — SIGNIFICANT CHANGE UP (ref 81–99)
MCV RBC AUTO: 90.2 FL — SIGNIFICANT CHANGE UP (ref 81–99)
MCV RBC AUTO: 92.1 FL — SIGNIFICANT CHANGE UP (ref 81–99)
MCV RBC AUTO: 92.1 FL — SIGNIFICANT CHANGE UP (ref 81–99)
MONOCYTES # BLD AUTO: 0.65 K/UL — HIGH (ref 0.1–0.6)
MONOCYTES # BLD AUTO: 0.65 K/UL — HIGH (ref 0.1–0.6)
MONOCYTES NFR BLD AUTO: 11.8 % — HIGH (ref 1.7–9.3)
MONOCYTES NFR BLD AUTO: 11.8 % — HIGH (ref 1.7–9.3)
NEUTROPHILS # BLD AUTO: 3.73 K/UL — SIGNIFICANT CHANGE UP (ref 1.4–6.5)
NEUTROPHILS # BLD AUTO: 3.73 K/UL — SIGNIFICANT CHANGE UP (ref 1.4–6.5)
NEUTROPHILS NFR BLD AUTO: 67.3 % — SIGNIFICANT CHANGE UP (ref 42.2–75.2)
NEUTROPHILS NFR BLD AUTO: 67.3 % — SIGNIFICANT CHANGE UP (ref 42.2–75.2)
NRBC # BLD: 0 /100 WBCS — SIGNIFICANT CHANGE UP (ref 0–0)
PLATELET # BLD AUTO: 220 K/UL — SIGNIFICANT CHANGE UP (ref 130–400)
PLATELET # BLD AUTO: 220 K/UL — SIGNIFICANT CHANGE UP (ref 130–400)
PLATELET # BLD AUTO: 227 K/UL — SIGNIFICANT CHANGE UP (ref 130–400)
PLATELET # BLD AUTO: 227 K/UL — SIGNIFICANT CHANGE UP (ref 130–400)
PMV BLD: 10 FL — SIGNIFICANT CHANGE UP (ref 7.4–10.4)
PMV BLD: 10 FL — SIGNIFICANT CHANGE UP (ref 7.4–10.4)
PMV BLD: 10.1 FL — SIGNIFICANT CHANGE UP (ref 7.4–10.4)
PMV BLD: 10.1 FL — SIGNIFICANT CHANGE UP (ref 7.4–10.4)
POTASSIUM SERPL-MCNC: 3.9 MMOL/L — SIGNIFICANT CHANGE UP (ref 3.5–5)
POTASSIUM SERPL-MCNC: 3.9 MMOL/L — SIGNIFICANT CHANGE UP (ref 3.5–5)
POTASSIUM SERPL-SCNC: 3.9 MMOL/L — SIGNIFICANT CHANGE UP (ref 3.5–5)
POTASSIUM SERPL-SCNC: 3.9 MMOL/L — SIGNIFICANT CHANGE UP (ref 3.5–5)
PROT SERPL-MCNC: 5 G/DL — LOW (ref 6–8)
PROT SERPL-MCNC: 5 G/DL — LOW (ref 6–8)
RBC # BLD: 2.82 M/UL — LOW (ref 4.2–5.4)
RBC # BLD: 2.82 M/UL — LOW (ref 4.2–5.4)
RBC # BLD: 2.97 M/UL — LOW (ref 4.2–5.4)
RBC # BLD: 2.97 M/UL — LOW (ref 4.2–5.4)
RBC # FLD: 12.7 % — SIGNIFICANT CHANGE UP (ref 11.5–14.5)
SODIUM SERPL-SCNC: 138 MMOL/L — SIGNIFICANT CHANGE UP (ref 135–146)
SODIUM SERPL-SCNC: 138 MMOL/L — SIGNIFICANT CHANGE UP (ref 135–146)
WBC # BLD: 5.53 K/UL — SIGNIFICANT CHANGE UP (ref 4.8–10.8)
WBC # BLD: 5.53 K/UL — SIGNIFICANT CHANGE UP (ref 4.8–10.8)
WBC # BLD: 6.91 K/UL — SIGNIFICANT CHANGE UP (ref 4.8–10.8)
WBC # BLD: 6.91 K/UL — SIGNIFICANT CHANGE UP (ref 4.8–10.8)
WBC # FLD AUTO: 5.53 K/UL — SIGNIFICANT CHANGE UP (ref 4.8–10.8)
WBC # FLD AUTO: 5.53 K/UL — SIGNIFICANT CHANGE UP (ref 4.8–10.8)
WBC # FLD AUTO: 6.91 K/UL — SIGNIFICANT CHANGE UP (ref 4.8–10.8)
WBC # FLD AUTO: 6.91 K/UL — SIGNIFICANT CHANGE UP (ref 4.8–10.8)

## 2023-12-24 PROCEDURE — 99232 SBSQ HOSP IP/OBS MODERATE 35: CPT

## 2023-12-24 RX ORDER — SENNA PLUS 8.6 MG/1
2 TABLET ORAL
Qty: 0 | Refills: 0 | DISCHARGE
Start: 2023-12-24

## 2023-12-24 RX ORDER — MORPHINE SULFATE 50 MG/1
2 CAPSULE, EXTENDED RELEASE ORAL ONCE
Refills: 0 | Status: DISCONTINUED | OUTPATIENT
Start: 2023-12-24 | End: 2023-12-24

## 2023-12-24 RX ORDER — POLYETHYLENE GLYCOL 3350 17 G/17G
17 POWDER, FOR SOLUTION ORAL
Qty: 0 | Refills: 0 | DISCHARGE
Start: 2023-12-24

## 2023-12-24 RX ORDER — SODIUM CHLORIDE 9 MG/ML
500 INJECTION INTRAMUSCULAR; INTRAVENOUS; SUBCUTANEOUS ONCE
Refills: 0 | Status: COMPLETED | OUTPATIENT
Start: 2023-12-24 | End: 2023-12-24

## 2023-12-24 RX ADMIN — ENOXAPARIN SODIUM 40 MILLIGRAM(S): 100 INJECTION SUBCUTANEOUS at 11:12

## 2023-12-24 RX ADMIN — TRAMADOL HYDROCHLORIDE 50 MILLIGRAM(S): 50 TABLET ORAL at 13:22

## 2023-12-24 RX ADMIN — Medication 650 MILLIGRAM(S): at 06:10

## 2023-12-24 RX ADMIN — Medication 15 MILLIGRAM(S): at 11:42

## 2023-12-24 RX ADMIN — MORPHINE SULFATE 2 MILLIGRAM(S): 50 CAPSULE, EXTENDED RELEASE ORAL at 23:45

## 2023-12-24 RX ADMIN — MORPHINE SULFATE 2 MILLIGRAM(S): 50 CAPSULE, EXTENDED RELEASE ORAL at 23:29

## 2023-12-24 RX ADMIN — TRAMADOL HYDROCHLORIDE 50 MILLIGRAM(S): 50 TABLET ORAL at 13:52

## 2023-12-24 RX ADMIN — Medication 100 MILLIGRAM(S): at 06:11

## 2023-12-24 RX ADMIN — CELECOXIB 200 MILLIGRAM(S): 200 CAPSULE ORAL at 17:55

## 2023-12-24 RX ADMIN — Medication 650 MILLIGRAM(S): at 17:51

## 2023-12-24 RX ADMIN — CELECOXIB 200 MILLIGRAM(S): 200 CAPSULE ORAL at 17:51

## 2023-12-24 RX ADMIN — TRAMADOL HYDROCHLORIDE 50 MILLIGRAM(S): 50 TABLET ORAL at 22:41

## 2023-12-24 RX ADMIN — SODIUM CHLORIDE 250 MILLILITER(S): 9 INJECTION INTRAMUSCULAR; INTRAVENOUS; SUBCUTANEOUS at 17:51

## 2023-12-24 RX ADMIN — SENNA PLUS 2 TABLET(S): 8.6 TABLET ORAL at 22:08

## 2023-12-24 RX ADMIN — Medication 125 MICROGRAM(S): at 06:11

## 2023-12-24 RX ADMIN — CELECOXIB 200 MILLIGRAM(S): 200 CAPSULE ORAL at 06:36

## 2023-12-24 RX ADMIN — Medication 15 MILLIGRAM(S): at 11:12

## 2023-12-24 RX ADMIN — Medication 650 MILLIGRAM(S): at 11:12

## 2023-12-24 RX ADMIN — Medication 650 MILLIGRAM(S): at 11:42

## 2023-12-24 RX ADMIN — Medication 650 MILLIGRAM(S): at 17:55

## 2023-12-24 RX ADMIN — TRAMADOL HYDROCHLORIDE 50 MILLIGRAM(S): 50 TABLET ORAL at 23:15

## 2023-12-24 NOTE — DISCHARGE NOTE PROVIDER - PROVIDER TOKENS
PROVIDER:[TOKEN:[[id number]]],PROVIDER:[TOKEN:[[id number]]] PROVIDER:[TOKEN:[[id number]]],PROVIDER:[TOKEN:[[id number]]]

## 2023-12-24 NOTE — DISCHARGE NOTE PROVIDER - NSDCCPCAREPLAN_GEN_ALL_CORE_FT
PRINCIPAL DISCHARGE DIAGNOSIS  Diagnosis: Hip fracture, left  Assessment and Plan of Treatment: you presented with left hip fracture secondary to mechanical fall and underwent ORIF with orthopedic surgery. Take Aspirin 81 mg twice a day for 30 days to prevent clotting. take pain medication as needed. follow up with Dr. Boom Carpenter within 2 weeks of discharge. Weight bearing 50 % with walker , home physical therapy.         SECONDARY DISCHARGE DIAGNOSES  Diagnosis: Acute on chronic blood loss anemia  Assessment and Plan of Treatment: blood counts stable  repeat blood work with pcp within 2 weeks       PRINCIPAL DISCHARGE DIAGNOSIS  Diagnosis: Hip fracture, left  Assessment and Plan of Treatment: you presented with left hip fracture secondary to mechanical fall and underwent ORIF with orthopedic surgery. Take Aspirin 81 mg twice a day for 30 days to prevent clotting. take pain medication as needed. follow up with Dr. Boom Carpenter within 2 weeks of discharge. Weight bearing 50 % with walker , home physical therapy; you rquested walker and commode which has been arranged by case management         SECONDARY DISCHARGE DIAGNOSES  Diagnosis: Acute on chronic blood loss anemia  Assessment and Plan of Treatment: blood counts stable  repeat blood work with pcp within 2 weeks

## 2023-12-24 NOTE — DISCHARGE NOTE PROVIDER - HOSPITAL COURSE
69 YO F with PMH of hypothyroidism presents to er for fall, occurred today, left sided, c/o left hip pain.    # Left hip FX/ acute comminuted proximal femoral shaft fracture sp OR 12/23  - PT /OT eval   - DVT PPX  - Pain control  - Diet   - Ortho following - spoke to Dr. Boom Carpenter- ASA 81 mg bid for 30 days for dvt ppx ; op follow up     # Acute on chronic blood loss anemia - stable   - hemodynamically stable  - patient reports no pain at the L hip site of sx  - repeat CBC , monitor Hb , active type and screen  - asymptomatic   - follow up op for repeat cbc within 1 -2 weeks       # Post-op urinary retention , lynn inserted in pacu , will attempt TOV when patient ambulating , TOV _______    # H/O Hypothyroid- synthroid 250mcg po QD    attending attestation:  patient seen and examined on day of discharge  labs and vitals reviewed  patient has no complaints  plan of care discussed with patient/family in agreement and understanding   67 YO F with PMH of hypothyroidism presents to er for fall, occurred today, left sided, c/o left hip pain.    # Left hip FX/ acute comminuted proximal femoral shaft fracture sp OR 12/23  - PT /OT eval   - DVT PPX  - Pain control  - Diet   - Ortho following - spoke to Dr. Boom Carpenter- ASA 81 mg bid for 30 days for dvt ppx ; op follow up     # Acute on chronic blood loss anemia - stable   - hemodynamically stable  - patient reports no pain at the L hip site of sx  - repeat CBC , monitor Hb , active type and screen  - asymptomatic   - follow up op for repeat cbc within 1 -2 weeks       # Post-op urinary retention , lynn inserted in pacu , will attempt TOV when patient ambulating , passed TOV    # H/O Hypothyroid- synthroid 250mcg po QD    attending attestation:  patient seen and examined on day of discharge  labs and vitals reviewed  patient has no complaints  plan of care discussed with patient/family in agreement and understanding   67 YO F with PMH of hypothyroidism presents to er for fall, occurred today, left sided, c/o left hip pain.    # Left hip FX/ acute comminuted proximal femoral shaft fracture sp OR 12/23  - PT /OT eval   - DVT PPX  - Pain control  - Diet   - Ortho following - spoke to Dr. Boom Carpenter- ASA 81 mg bid for 30 days for dvt ppx ; op follow up     # Acute on chronic blood loss anemia - stable   - hemodynamically stable  - patient reports no pain at the L hip site of sx  - repeat CBC , monitor Hb , active type and screen  - asymptomatic   - follow up op for repeat cbc within 1 -2 weeks     # Episode of orthostatic hypotension 12/24 upon rising  - IVF given , pt notes marked improvement in dizziness and asymptomatic on day of dc   - advised to get up slowly and encourage po intake and hydration         # Post-op urinary retention , lynn inserted in pacu , will attempt TOV when patient ambulating , passed TOV    # H/O Hypothyroid- synthroid 250mcg po QD    attending attestation:  patient seen and examined on day of discharge  labs and vitals reviewed  patient has no complaints  plan of care discussed with patient/family in agreement and understanding

## 2023-12-24 NOTE — DISCHARGE NOTE PROVIDER - CARE PROVIDER_API CALL
Boom-Jona Carpenter  Orthopaedic Surgery  [address] [place] [address]  Phone: [phone]  Fax: [phone]  Follow Up Time:     Charity Romano  Internal Medicine  [address] [place] [address]  Phone: [phone]  Fax: ()-  Follow Up Time:    Boom-Jona Carpenter  Orthopaedic Surgery  [address] [place] [address]  Phone: [phone]  Fax: [phone]  Follow Up Time:     Charity Romano  Internal Medicine  [address] [place] [address]  Phone: [phone]  Fax: ()-  Follow Up Time:

## 2023-12-24 NOTE — PROGRESS NOTE ADULT - SUBJECTIVE AND OBJECTIVE BOX
pt s&e  doing well  pain controlled   (-)n/v/cp/sob    Vital Signs Last 24 Hrs  T(C): 35.8 (24 Dec 2023 05:13), Max: 36.9 (23 Dec 2023 10:57)  T(F): 96.4 (24 Dec 2023 05:13), Max: 98.5 (23 Dec 2023 10:57)  HR: 79 (24 Dec 2023 05:13) (70 - 90)  BP: 96/50 (24 Dec 2023 05:13) (96/50 - 147/70)  BP(mean): 100 (23 Dec 2023 10:57) (100 - 100)  RR: 16 (24 Dec 2023 05:13) (12 - 16)  SpO2: 97% (23 Dec 2023 14:14) (95% - 98%)               dressings c/d  leg soft  nvid    impression  s/p gamma nail left sub troch fracture      f/u am labs  50%wb, walker with PT  DVT prophylaxis  pain control  DC planning

## 2023-12-24 NOTE — PROGRESS NOTE ADULT - SUBJECTIVE AND OBJECTIVE BOX
SUBJECTIVE:    Patient is a 68y old Female who presents with a chief complaint of fall/ left hip FX (24 Dec 2023 08:24)    Currently admitted to medicine with the primary diagnosis of Hip fracture, left       Today is hospital day 2d. This morning she is resting comfortably in bed and reports no new issues or overnight events.     ROS:   CONSTITUTIONAL: No weakness, fevers or chills   EYES/ENT: No visual changes; No vertigo or throat pain   NECK: No pain or stiffness   RESPIRATORY: No cough, wheezing, hemoptysis; No shortness of breath   CARDIOVASCULAR: No chest pain or palpitations   GASTROINTESTINAL: No abdominal or epigastric pain. No nausea, vomiting, or hematemesis; No diarrhea or constipation. No melena or hematochezia.  GENITOURINARY: No dysuria, frequency or hematuria  NEUROLOGICAL: No numbness or weakness  SKIN: No itching, rashes      PAST MEDICAL & SURGICAL HISTORY  GERD (gastroesophageal reflux disease)    Former smoker    Anemia  last iron infusion 8/2022    Hypothyroidism  as per pt- i had all my thyroid levels drawn today 10/18/22 at my [facility] office    H/O: obesity  bmi-34.7%    H/O partial thyroidectomy  THYROID NODULE    H/O gastric bypass      SOCIAL HISTORY:    ALLERGIES:  No Known Allergies    MEDICATIONS:  STANDING MEDICATIONS  acetaminophen     Tablet .. 650 milliGRAM(s) Oral every 6 hours  celecoxib 200 milliGRAM(s) Oral every 12 hours  chlorhexidine 2% Cloths 1 Application(s) Topical <User Schedule>  enoxaparin Injectable 40 milliGRAM(s) SubCutaneous every 24 hours  influenza  Vaccine (HIGH DOSE) 0.7 milliLiter(s) IntraMuscular once  levothyroxine 125 MICROGram(s) Oral daily  pantoprazole    Tablet 40 milliGRAM(s) Oral before breakfast  polyethylene glycol 3350 17 Gram(s) Oral at bedtime  senna 2 Tablet(s) Oral at bedtime    PRN MEDICATIONS  aluminum hydroxide/magnesium hydroxide/simethicone Suspension 30 milliLiter(s) Oral four times a day PRN  magnesium hydroxide Suspension 30 milliLiter(s) Oral daily PRN  traMADol 50 milliGRAM(s) Oral every 4 hours PRN    VITALS:   T(F): 96.6  HR: 69  BP: 96/53  RR: 16  SpO2: --    LABS:  Negative for smoking/alcohol/drug use.                         8.6    5.53  )-----------( 220      ( 24 Dec 2023 06:38 )             25.3     12-24    138  |  102  |  17  ----------------------------<  114<H>  3.9   |  28  |  0.7    Ca    7.6<L>      24 Dec 2023 06:38    TPro  5.0<L>  /  Alb  3.3<L>  /  TBili  0.3  /  DBili  x   /  AST  28  /  ALT  14  /  AlkPhos  79  12-24      Urinalysis Basic - ( 24 Dec 2023 06:38 )    Color: x / Appearance: x / SG: x / pH: x  Gluc: 114 mg/dL / Ketone: x  / Bili: x / Urobili: x   Blood: x / Protein: x / Nitrite: x   Leuk Esterase: x / RBC: x / WBC x   Sq Epi: x / Non Sq Epi: x / Bacteria: x      RADIOLOGY:    PHYSICAL EXAM:  GEN: No acute distress  HEENT: normocephalic, atraumatic, aniceteric  LUNGS: bl breath sounds   HEART: S1/S2 present. RRR, no murmurs  ABD: Soft, non-tender, non-distended. Bowel sounds present  EXT: warm   NEURO: AAOX3, normal affect      ASSESSMENT AND PLAN:    69 YO F with PMH of hypothyroidism presents to er for fall, occurred today, left sided, c/o left hip pain.    # Left hip FX/ acute comminuted proximal femoral shaft fracture sp OR 12/23  - PT /OT eval   - DVT PPX  - Pain control  - Diet   - Ortho following     # Acute on chronic blood loss anemia   - hemodynamically stable  - patient reports no pain at the L hip site of sx  - repeat CBC , monitor Hb , active type and screen      # Post-op urinary retention , lynn inserted in pacu , will attempt TOV when patient ambulating , TOV today     # H/O Hypothyroid- synthroid 250mcg po QD    # dvt/gi ppx/diet  # dispo: acute - home pt   # handoff: repeat cbc / TOV    SUBJECTIVE:    Patient is a 68y old Female who presents with a chief complaint of fall/ left hip FX (24 Dec 2023 08:24)    Currently admitted to medicine with the primary diagnosis of Hip fracture, left       Today is hospital day 2d. This morning she is resting comfortably in bed and reports no new issues or overnight events.     ROS:   CONSTITUTIONAL: No weakness, fevers or chills   EYES/ENT: No visual changes; No vertigo or throat pain   NECK: No pain or stiffness   RESPIRATORY: No cough, wheezing, hemoptysis; No shortness of breath   CARDIOVASCULAR: No chest pain or palpitations   GASTROINTESTINAL: No abdominal or epigastric pain. No nausea, vomiting, or hematemesis; No diarrhea or constipation. No melena or hematochezia.  GENITOURINARY: No dysuria, frequency or hematuria  NEUROLOGICAL: No numbness or weakness  SKIN: No itching, rashes      PAST MEDICAL & SURGICAL HISTORY  GERD (gastroesophageal reflux disease)    Former smoker    Anemia  last iron infusion 8/2022    Hypothyroidism  as per pt- i had all my thyroid levels drawn today 10/18/22 at my [facility] office    H/O: obesity  bmi-34.7%    H/O partial thyroidectomy  THYROID NODULE    H/O gastric bypass      SOCIAL HISTORY:    ALLERGIES:  No Known Allergies    MEDICATIONS:  STANDING MEDICATIONS  acetaminophen     Tablet .. 650 milliGRAM(s) Oral every 6 hours  celecoxib 200 milliGRAM(s) Oral every 12 hours  chlorhexidine 2% Cloths 1 Application(s) Topical <User Schedule>  enoxaparin Injectable 40 milliGRAM(s) SubCutaneous every 24 hours  influenza  Vaccine (HIGH DOSE) 0.7 milliLiter(s) IntraMuscular once  levothyroxine 125 MICROGram(s) Oral daily  pantoprazole    Tablet 40 milliGRAM(s) Oral before breakfast  polyethylene glycol 3350 17 Gram(s) Oral at bedtime  senna 2 Tablet(s) Oral at bedtime    PRN MEDICATIONS  aluminum hydroxide/magnesium hydroxide/simethicone Suspension 30 milliLiter(s) Oral four times a day PRN  magnesium hydroxide Suspension 30 milliLiter(s) Oral daily PRN  traMADol 50 milliGRAM(s) Oral every 4 hours PRN    VITALS:   T(F): 96.6  HR: 69  BP: 96/53  RR: 16  SpO2: --    LABS:  Negative for smoking/alcohol/drug use.                         8.6    5.53  )-----------( 220      ( 24 Dec 2023 06:38 )             25.3     12-24    138  |  102  |  17  ----------------------------<  114<H>  3.9   |  28  |  0.7    Ca    7.6<L>      24 Dec 2023 06:38    TPro  5.0<L>  /  Alb  3.3<L>  /  TBili  0.3  /  DBili  x   /  AST  28  /  ALT  14  /  AlkPhos  79  12-24      Urinalysis Basic - ( 24 Dec 2023 06:38 )    Color: x / Appearance: x / SG: x / pH: x  Gluc: 114 mg/dL / Ketone: x  / Bili: x / Urobili: x   Blood: x / Protein: x / Nitrite: x   Leuk Esterase: x / RBC: x / WBC x   Sq Epi: x / Non Sq Epi: x / Bacteria: x      RADIOLOGY:    PHYSICAL EXAM:  GEN: No acute distress  HEENT: normocephalic, atraumatic, aniceteric  LUNGS: bl breath sounds   HEART: S1/S2 present. RRR, no murmurs  ABD: Soft, non-tender, non-distended. Bowel sounds present  EXT: warm   NEURO: AAOX3, normal affect      ASSESSMENT AND PLAN:    67 YO F with PMH of hypothyroidism presents to er for fall, occurred today, left sided, c/o left hip pain.    # Left hip FX/ acute comminuted proximal femoral shaft fracture sp OR 12/23  - PT /OT eval   - DVT PPX  - Pain control  - Diet   - Ortho following     # Acute on chronic blood loss anemia   - hemodynamically stable  - patient reports no pain at the L hip site of sx  - repeat CBC , monitor Hb , active type and screen      # Post-op urinary retention , lynn inserted in pacu , will attempt TOV when patient ambulating , TOV today     # H/O Hypothyroid- synthroid 250mcg po QD    # dvt/gi ppx/diet  # dispo: acute - home pt   # handoff: repeat cbc / TOV

## 2023-12-24 NOTE — DISCHARGE NOTE PROVIDER - NSDCMRMEDTOKEN_GEN_ALL_CORE_FT
NexIUM 20 mg oral delayed release capsule: 1 cap(s) orally once a day  phenazopyridine 100 mg oral tablet: 2 tab(s) orally 3 times a day (after meals)  polyethylene glycol 3350 oral powder for reconstitution: 17 gram(s) orally once a day (at bedtime)  senna leaf extract oral tablet: 2 tab(s) orally once a day (at bedtime)  Synthroid: 250 microgram(s) orally once a day  Vitamin D2: orally once a day   aspirin 81 mg oral capsule: 1 cap(s) orally 2 times a day  NexIUM 20 mg oral delayed release capsule: 1 cap(s) orally once a day  phenazopyridine 100 mg oral tablet: 2 tab(s) orally 3 times a day (after meals)  polyethylene glycol 3350 oral powder for reconstitution: 17 gram(s) orally once a day (at bedtime)  senna leaf extract oral tablet: 2 tab(s) orally once a day (at bedtime)  Synthroid: 250 microgram(s) orally once a day  traMADol 50 mg oral tablet: 1 tab(s) orally every 4 hours As needed Mild Pain (1 - 3)  Vitamin D2: orally once a day   aspirin 81 mg oral capsule: 1 cap(s) orally 2 times a day  NexIUM 20 mg oral delayed release capsule: 1 cap(s) orally once a day  phenazopyridine 100 mg oral tablet: 2 tab(s) orally 3 times a day (after meals)  polyethylene glycol 3350 oral powder for reconstitution: 17 gram(s) orally once a day (at bedtime)  senna leaf extract oral tablet: 2 tab(s) orally once a day (at bedtime)  Synthroid: 250 microgram(s) orally once a day  traMADol 50 mg oral tablet: 1 tab(s) orally every 4 hours As needed Mild Pain (1 - 3)  traMADol 50 mg oral tablet: 1 tab(s) orally every 4 hours as needed for Mild Pain (1 - 3) MDD 6 TABS MDD: 6  Vitamin D2: orally once a day

## 2023-12-25 ENCOUNTER — TRANSCRIPTION ENCOUNTER (OUTPATIENT)
Age: 68
End: 2023-12-25

## 2023-12-25 VITALS
TEMPERATURE: 97 F | DIASTOLIC BLOOD PRESSURE: 55 MMHG | SYSTOLIC BLOOD PRESSURE: 102 MMHG | OXYGEN SATURATION: 98 % | HEART RATE: 62 BPM | RESPIRATION RATE: 18 BRPM

## 2023-12-25 DIAGNOSIS — I95.9 HYPOTENSION, UNSPECIFIED: ICD-10-CM

## 2023-12-25 PROCEDURE — 99239 HOSP IP/OBS DSCHRG MGMT >30: CPT

## 2023-12-25 RX ORDER — TRAMADOL HYDROCHLORIDE 50 MG/1
1 TABLET ORAL
Qty: 30 | Refills: 0
Start: 2023-12-25 | End: 2023-12-29

## 2023-12-25 RX ORDER — ASPIRIN/CALCIUM CARB/MAGNESIUM 324 MG
1 TABLET ORAL
Qty: 60 | Refills: 0
Start: 2023-12-25 | End: 2024-01-23

## 2023-12-25 RX ORDER — TRAMADOL HYDROCHLORIDE 50 MG/1
1 TABLET ORAL
Qty: 0 | Refills: 0 | DISCHARGE
Start: 2023-12-25

## 2023-12-25 RX ORDER — METHOCARBAMOL 500 MG/1
750 TABLET, FILM COATED ORAL EVERY 8 HOURS
Refills: 0 | Status: DISCONTINUED | OUTPATIENT
Start: 2023-12-25 | End: 2023-12-25

## 2023-12-25 RX ADMIN — TRAMADOL HYDROCHLORIDE 50 MILLIGRAM(S): 50 TABLET ORAL at 11:45

## 2023-12-25 RX ADMIN — ENOXAPARIN SODIUM 40 MILLIGRAM(S): 100 INJECTION SUBCUTANEOUS at 11:14

## 2023-12-25 RX ADMIN — PANTOPRAZOLE SODIUM 40 MILLIGRAM(S): 20 TABLET, DELAYED RELEASE ORAL at 05:48

## 2023-12-25 RX ADMIN — Medication 650 MILLIGRAM(S): at 11:14

## 2023-12-25 RX ADMIN — Medication 650 MILLIGRAM(S): at 05:48

## 2023-12-25 RX ADMIN — Medication 650 MILLIGRAM(S): at 11:45

## 2023-12-25 RX ADMIN — TRAMADOL HYDROCHLORIDE 50 MILLIGRAM(S): 50 TABLET ORAL at 11:18

## 2023-12-25 RX ADMIN — CELECOXIB 200 MILLIGRAM(S): 200 CAPSULE ORAL at 05:48

## 2023-12-25 RX ADMIN — Medication 125 MICROGRAM(S): at 05:48

## 2023-12-25 RX ADMIN — Medication 650 MILLIGRAM(S): at 01:30

## 2023-12-25 RX ADMIN — Medication 650 MILLIGRAM(S): at 00:30

## 2023-12-25 NOTE — DISCHARGE NOTE NURSING/CASE MANAGEMENT/SOCIAL WORK - NSDCPEFALRISK_GEN_ALL_CORE
For information on Fall & Injury Prevention, visit: [email or web address] OR  [email or web address]/[email or web address] OR  https://www.cdc.gov/steadi/patient.html For information on Fall & Injury Prevention, visit: [email or web address] OR  [email or web address]/[email or web address] OR  https://www.cdc.gov/steadi/patient.html

## 2023-12-25 NOTE — DISCHARGE NOTE NURSING/CASE MANAGEMENT/SOCIAL WORK - PATIENT PORTAL LINK FT
You can access the FollowMyHealth Patient Portal offered by [facility] by registering at the following website: [email or web address]. By joining [employer]’s FollowMyHealth portal, you will also be able to view your health information using other applications (apps) compatible with our system. You can access the FollowMyHealth Patient Portal offered by [facility] by registering at the following website: [email or web address]. By joining "[employer]’s FollowMyHealth portal, you will also be able to view your health information using other applications (apps) compatible with our system.

## 2023-12-25 NOTE — CHART NOTE - NSCHARTNOTEFT_GEN_A_CORE
68 year old patient requires 3-1 bedside commode due to recent fall resulting in left hip fracture. Patient underwent left hip ORIF with maribell nailing. Patient is only 50%weight bearing on the left lower extremity in the immediate post operative period. Pt is at risk to fall. Patient cannot safely ambulate to the bathroom.    Patient will require rolling walker upon discharge due to the same above stated reasons. Patient is 50% weight bearing on the LLE due to recent fracture and ORIF of left femur. Patient needs rolling walker at the recommendation of both orthopedics and physical therapy for safe discharge home. Patient is willing and able to use walker.
Called by nurse Pt c/o severe pain not controlled by tramadol and asking for morphine.   PA Note:    Called to evaluate Patient for     T(C): 35.8 (12-24-23 @ 21:22), Max: 35.9 (12-24-23 @ 12:54)  HR: 80 (12-24-23 @ 21:22) (67 - 83)  BP: 136/63 (12-24-23 @ 21:22) (90/51 - 136/63)  RR: 18 (12-24-23 @ 21:22) (16 - 18)  SpO2: --    68yFemale    PHYSICAL EXAM:      Constitutional: NAD, A&O x3    Eyes: PERRLA, no conjuctivitis    Neck: no lymphadenopathy    Respiratory: +air entry, no rales, no rhonchi, no wheezes    Cardiovascular: +S1 and S2, regular rate and rhythm    Gastrointestinal: +BS, soft, non-tender, not distended    Extremities:  no edema, no calf tenderness    Skin: no rashes, normal turgor                            8.8    6.91  )-----------( 227      ( 24 Dec 2023 15:22 )             26.8     12-24    138  |  102  |  17  ----------------------------<  114<H>  3.9   |  28  |  0.7    Ca    7.6<L>      24 Dec 2023 06:38    TPro  5.0<L>  /  Alb  3.3<L>  /  TBili  0.3  /  DBili  x   /  AST  28  /  ALT  14  /  AlkPhos  79  12-24          Urinalysis Basic - ( 24 Dec 2023 06:38 )    Color: x / Appearance: x / SG: x / pH: x  Gluc: 114 mg/dL / Ketone: x  / Bili: x / Urobili: x   Blood: x / Protein: x / Nitrite: x   Leuk Esterase: x / RBC: x / WBC x   Sq Epi: x / Non Sq Epi: x / Bacteria: x            CAPILLARY BLOOD GLUCOSE                  Assessment: break through pain      Plan:   morphine 2 mg x 1 dose    Notified:

## 2023-12-29 DIAGNOSIS — Z87.891 PERSONAL HISTORY OF NICOTINE DEPENDENCE: ICD-10-CM

## 2023-12-29 DIAGNOSIS — K21.9 GASTRO-ESOPHAGEAL REFLUX DISEASE WITHOUT ESOPHAGITIS: ICD-10-CM

## 2023-12-29 DIAGNOSIS — S72.142A DISPLACED INTERTROCHANTERIC FRACTURE OF LEFT FEMUR, INITIAL ENCOUNTER FOR CLOSED FRACTURE: ICD-10-CM

## 2023-12-29 DIAGNOSIS — E66.9 OBESITY, UNSPECIFIED: ICD-10-CM

## 2023-12-29 DIAGNOSIS — Z98.84 BARIATRIC SURGERY STATUS: ICD-10-CM
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
## 2023-12-29 DIAGNOSIS — R33.8 OTHER RETENTION OF URINE: ICD-10-CM

## 2023-12-29 DIAGNOSIS — D62 ACUTE POSTHEMORRHAGIC ANEMIA: ICD-10-CM

## 2023-12-29 DIAGNOSIS — S72.352A DISPLACED COMMINUTED FRACTURE OF SHAFT OF LEFT FEMUR, INITIAL ENCOUNTER FOR CLOSED FRACTURE: ICD-10-CM

## 2023-12-29 DIAGNOSIS — Z79.890 HORMONE REPLACEMENT THERAPY: ICD-10-CM

## 2023-12-29 DIAGNOSIS — I95.1 ORTHOSTATIC HYPOTENSION: ICD-10-CM

## 2023-12-29 DIAGNOSIS — E03.9 HYPOTHYROIDISM, UNSPECIFIED: ICD-10-CM

## 2024-01-05 ENCOUNTER — APPOINTMENT (OUTPATIENT)
Dept: ORTHOPEDIC SURGERY | Facility: CLINIC | Age: 69
End: 2024-01-05
Payer: MEDICARE

## 2024-01-05 ENCOUNTER — RESULT CHARGE (OUTPATIENT)
Age: 69
End: 2024-01-05

## 2024-01-05 PROCEDURE — 73521 X-RAY EXAM HIPS BI 2 VIEWS: CPT

## 2024-01-05 PROCEDURE — 99024 POSTOP FOLLOW-UP VISIT: CPT

## 2024-01-05 NOTE — HISTORY OF PRESENT ILLNESS
[de-identified] : f/u of subtrochanteric left hip fracture minimal pain using walker, 50%wb incisions well healed  x-rays demonstrate left ST hip fracture s/p gamma nail  plan: continue ttwb for 3 more wk f/u for repeat imaging and to advance wb in 3 wk

## 2024-01-30 ENCOUNTER — APPOINTMENT (OUTPATIENT)
Dept: ORTHOPEDIC SURGERY | Facility: CLINIC | Age: 69
End: 2024-01-30
Payer: MEDICARE

## 2024-01-30 ENCOUNTER — RESULT CHARGE (OUTPATIENT)
Age: 69
End: 2024-01-30

## 2024-01-30 PROCEDURE — 73502 X-RAY EXAM HIP UNI 2-3 VIEWS: CPT

## 2024-01-30 PROCEDURE — 99024 POSTOP FOLLOW-UP VISIT: CPT

## 2024-01-30 PROCEDURE — 73552 X-RAY EXAM OF FEMUR 2/>: CPT | Mod: LT

## 2024-01-30 NOTE — IMAGING
[de-identified] : Left hip exam no effusion, ecchymosis, no erythema, surgical incision site intact with no sign of infection, good range of motion with no pain, good strength, neurovascular intact

## 2024-01-30 NOTE — HISTORY OF PRESENT ILLNESS
[de-identified] : Patient is a 68-year-old female follow-up of subtrochanteric left hip fracture, denies pain, doing well, using walker for ambulation.  Denies numbness or tingling, denies instability.

## 2024-01-30 NOTE — DATA REVIEWED
[FreeTextEntry1] : X-ray left hip/femur: No acute fractures consultations, dislocations.  Noted surgical hardware intact good position signs of bone callus formation.

## 2024-01-30 NOTE — DISCUSSION/SUMMARY
[de-identified] : Patient is doing well at this time, discussed x-rays in detail showing bone healing.  Patient advance to weightbearing as tolerated and will continue using walker/cane as needed for stability and support.  Patient will follow-up in 6 to 8 weeks for reevaluation. Seen with Dr. Thomas.

## 2024-02-13 ENCOUNTER — APPOINTMENT (OUTPATIENT)
Dept: ORTHOPEDIC SURGERY | Facility: CLINIC | Age: 69
End: 2024-02-13

## 2024-02-16 ENCOUNTER — APPOINTMENT (OUTPATIENT)
Dept: ORTHOPEDIC SURGERY | Facility: CLINIC | Age: 69
End: 2024-02-16
Payer: MEDICARE

## 2024-02-16 PROCEDURE — 73552 X-RAY EXAM OF FEMUR 2/>: CPT | Mod: LT

## 2024-02-19 NOTE — HISTORY OF PRESENT ILLNESS
[de-identified] : f/u of left subtrochanteric fracture doing well no pain still notes she feels LLD otherwise no new issues nttp over the leg / thigh  x-rays show frx aligned with bridging callous  plan: will advance to wbat f/u in 4 weeks for repeat xray.

## 2024-03-19 ENCOUNTER — APPOINTMENT (OUTPATIENT)
Dept: ORTHOPEDIC SURGERY | Facility: CLINIC | Age: 69
End: 2024-03-19
Payer: MEDICARE

## 2024-03-19 PROCEDURE — 73552 X-RAY EXAM OF FEMUR 2/>: CPT | Mod: LT

## 2024-03-19 PROCEDURE — 99024 POSTOP FOLLOW-UP VISIT: CPT

## 2024-03-19 PROCEDURE — 72100 X-RAY EXAM L-S SPINE 2/3 VWS: CPT

## 2024-03-19 PROCEDURE — 72170 X-RAY EXAM OF PELVIS: CPT

## 2024-03-19 NOTE — HISTORY OF PRESENT ILLNESS
[de-identified] : f/u of left hip subtroch frx doing well as far as minimal pain  doing well walking, using a single point cane still with concern about lld will start PT

## 2024-03-28 ENCOUNTER — APPOINTMENT (OUTPATIENT)
Dept: ORTHOPEDIC SURGERY | Facility: CLINIC | Age: 69
End: 2024-03-28
Payer: MEDICARE

## 2024-03-28 PROCEDURE — 99213 OFFICE O/P EST LOW 20 MIN: CPT

## 2024-03-28 NOTE — IMAGING
[de-identified] : Pleasant late middle-aged woman walks into my office with a cane.  Clear Trendelenburg lurch.  No antalgia.  Physical examination: Left hip and thigh: Well-healed surgical scars.  No undue swelling in the area.  No lymph nodes in the groin.  No pain with rotation hip joint.  No pain with flexion 90 degrees.  No pain to deep palpation.  Clear Trendelenburg lurch.  Radiographs: Left femur (AP, lateral) reviewed from March 19, 2024 demonstrate scant callus formation with a left subtrochanteric femur fracture.  There is slight flexion of the proximal segment resulting effectively in some shortening.

## 2024-03-28 NOTE — HISTORY OF PRESENT ILLNESS
[de-identified] : 68-year-old woman referred to my office for second opinion regarding delayed union left subtrochanteric femur fracture.  She sustained the injury end of December and underwent open reduction internal fixation of a left subtrochanteric femur fracture with a long cephalomedullary nail on December 23, 2023.  Patient has felt that her hip mechanics have changed and that her leg is shorter and that she has difficulty walking without an assistive device.  Seen by Dr. Garcia on follow-up radiographs showed scant callus formation.  He was concerned that the fracture is not healed.  Patient has little in the way of complaints of pain just an unsteady gait.  No groin pain no thigh pain no knee pain.  Does not routinely require any pain medication.  Denies any fevers or drainage.

## 2024-03-28 NOTE — ASSESSMENT
[FreeTextEntry1] : 68-year-old woman chest about 3-month status post open reduction internal fixation left subtrochanteric femur fracture.  She is scant callus formation on serial radiographs and may be going on to a delayed or nonunion but she is quite asymptomatic.  Her symptoms of leg length disparity are probably related to mechanical changes associated with her change in alignment.  For now I would recommend continued conservative management.  Weight-bear as tolerated.  Continue PT.  Follow-up in my office in 3 months for repeat radiographs.  There is no further callus formation on radiographs on follow-up then we can consider a CT to better delineate healing.  Patient agreed with the plan.  Reassured.  Understands why her gait seems changed.

## 2024-05-09 ENCOUNTER — APPOINTMENT (OUTPATIENT)
Dept: ORTHOPEDIC SURGERY | Facility: CLINIC | Age: 69
End: 2024-05-09
Payer: MEDICARE

## 2024-05-09 PROCEDURE — 99213 OFFICE O/P EST LOW 20 MIN: CPT

## 2024-05-09 NOTE — HISTORY OF PRESENT ILLNESS
[de-identified] : f/u of left hip subtrochanteric fracture seems to be improving as far as pain, does have some buttock pain still feels a lld uses a cane has been doing PT  rec continued PT expect the pain and gait to improve f/u in 3 months.

## 2024-06-18 ENCOUNTER — APPOINTMENT (OUTPATIENT)
Dept: ORTHOPEDIC SURGERY | Facility: CLINIC | Age: 69
End: 2024-06-18
Payer: MEDICARE

## 2024-06-18 DIAGNOSIS — S72.002D FRACTURE OF UNSPECIFIED PART OF NECK OF LEFT FEMUR, SUBSEQUENT ENCOUNTER FOR CLOSED FRACTURE WITH ROUTINE HEALING: ICD-10-CM

## 2024-06-18 PROCEDURE — 73503 X-RAY EXAM HIP UNI 4/> VIEWS: CPT | Mod: LT

## 2024-06-18 PROCEDURE — 99213 OFFICE O/P EST LOW 20 MIN: CPT

## 2024-06-18 NOTE — ASSESSMENT
[FreeTextEntry1] : 68-year-old woman 7-month status post open reduction internal fixation left subtrochanteric femur fracture.  I suspect her buttock pain and lateral hip pain and lateral knee pain are all related to hip abductor dysfunction and some mild iliotibial band syndrome.  I recommend continued physical therapy.  This is already improved her symptoms.  I reviewed with her some exercises she can do on her own and she is going to return to the gym.  We talked about osteoporosis.  She has been on Nexium for years and has a known history of osteoporosis for which she is on Prolia.  She is at risk for calcium insufficiency.  Discussed calcium citrate use.  Follow-up 3 months for interval check.  Only repeat radiographs of worsening pain.

## 2024-06-18 NOTE — IMAGING
[de-identified] : Pleasant middle-aged woman walks into my office with mild Trendelenburg lurch and no distress.  Physical examination: Left hip and lower extremity: No tenderness palpation over her inguinal crease.  Mild tenderness over the trochanteric bursa and along the course iliotibial band to the lateral epicondyle.  Provocative testing ITB elicits some mild discomfort.  Flexion up to 90 degrees without pain.  No undue discomfort with forced internal/external rotation hip joint.  Single-leg stance demonstrates a Trendelenburg lurch on both sides.  No lymph nodes in the inguinal crease.  Calf soft no cords.  No undue swelling lower extremity.  Radiographs: Left hip (AP, lateral): Fracture lines still present.  Soft tissue habitus obscures imaging somewhat but there is some suggestion of fractures of healed.  Hardware remains in place with no evidence of hardware loosening.

## 2024-06-18 NOTE — HISTORY OF PRESENT ILLNESS
[de-identified] : 68-year-old woman status post ORIF left subtrochanteric femur fracture with long cephalomedullary nail on December 23, 2023 by Dr. Boom Garcia returns for interval follow-up.  She notes that the buttock and lateral sided knee and thigh pain have been improving.  She remains on Tylenol for pain relief.  She has been going to therapy which she has found helpful.  She still feels that she has a leg length discrepancy and wonders if this is a cause of her limp.  Denies any significant groin pain.

## 2024-06-20 ENCOUNTER — APPOINTMENT (OUTPATIENT)
Dept: NEUROLOGY | Facility: CLINIC | Age: 69
End: 2024-06-20
Payer: MEDICARE

## 2024-06-20 VITALS
SYSTOLIC BLOOD PRESSURE: 131 MMHG | HEIGHT: 67 IN | HEART RATE: 55 BPM | DIASTOLIC BLOOD PRESSURE: 77 MMHG | WEIGHT: 185 LBS | BODY MASS INDEX: 29.03 KG/M2

## 2024-06-20 VITALS — HEIGHT: 62 IN | WEIGHT: 178 LBS | BODY MASS INDEX: 32.76 KG/M2

## 2024-06-20 DIAGNOSIS — G60.3 IDIOPATHIC PROGRESSIVE NEUROPATHY: ICD-10-CM

## 2024-06-20 PROCEDURE — 99204 OFFICE O/P NEW MOD 45 MIN: CPT

## 2024-06-20 RX ORDER — ESOMEPRAZOLE MAGNESIUM 20 MG/1
20 CAPSULE, DELAYED RELEASE ORAL
Refills: 0 | Status: ACTIVE | COMMUNITY

## 2024-06-20 RX ORDER — MULTIVITAMIN,THER AND MINERALS
TABLET ORAL
Refills: 0 | Status: ACTIVE | COMMUNITY

## 2024-06-20 RX ORDER — LEVOTHYROXINE SODIUM 200 UG/1
200 TABLET ORAL
Refills: 0 | Status: ACTIVE | COMMUNITY

## 2024-06-20 NOTE — ASSESSMENT
[FreeTextEntry1] : Impression is one of polyneuropathy. She does not yet have a clear etiology for this diagnosis. I have ordered an EMG of the upper and lower extremities. If the EMGs confirm polyneuropathy, I will have to order a metabolic panel.  She will follow up once her testing is completed.   Total clinician time spent today on the patient is 45 minutes including preparing to see the patient, obtaining and/or reviewing and confirming history, performing medically necessary and appropriate examination, counseling and educating the patient and/or family, documenting clinical information in the EHR and communicating and/or referring to other healthcare professionals.     Entered by Charity Marie acting as scribe for Dr. Vega.   The documentation recorded by the scribe, in my presence, accurately reflects the service I personally performed, and the decisions made by me with my edits as appropriate. Jose Vega MD, FAAN, FACP Diplomate [employer]iatry & Neurology.

## 2024-06-20 NOTE — PHYSICAL EXAM
[de-identified] : NEUROLOGICAL EXAMINATION:  Mental Status: Patient is a good informant with intact orientation, attention, concentration, recent and remote memory. Language evaluation reveals no evidence of aphasia. Fund of knowledge is normal.  Cranial Nerves Cranial Nerves:  II, III, IV, VI: Pupils are equal, round, and reactive to light and accommodation. No evidence of afferent pupillary defect. Visual fields are full to confrontation. Eye movements are full without evidence of nystagmus or internuclear ophthalmoplegia. Funduscopic examination reveals sharp disc margins.  V: Normal jaw movements. Normal facial sensation.  VII: Normal facial motor testing.  VIII: Grossly normal hearing bilaterally.  IX, X: Palate moves symmetrically. No dysarthria.  XI: Normal shoulder shrug and sternocleidomastoid power.  XII: Tongue appears normal and protrudes in the midline.  Motor: Normal bulk, tone, and power throughout.  Muscle Stretch Reflexes (right/left): 2+ symmetrical. Knee jerks normal. Absent ankle jerks bilaterally. Vibration sensation intact in both lower extremities. Ambulation with a cane because of her left femur fracture. Romberg was performed and she wobbled but she did not fall. Tandem could not be done as she walks with a cane.

## 2024-06-20 NOTE — HISTORY OF PRESENT ILLNESS
[de-identified] : Ms. Chelsea Gasca is a 68-year-old female presenting for an initial Neurological consultation. This is to go to Dr. Romano. Her symptoms began about 2-3 years ago when she developed tingling in her left foot. She saw Dr. You at that time, and he did an EMG of the lower extremities which showed polyneuropathy. He did also order an MRI of the lumbar spine which showed minor narrowing at the L5-S1 level. She apparently never had a thorough neurological workup. She is also now experiencing tingling in her bilateral feet as well as upper extremities. She recently had a fall in December which led to her developing a fracture to her femur. She has since been using a cane to ambulate. She did have gastric bypass surgery more than 20 years ago, but the tingling only began in 2021 when she initially saw Dr. You. She is not a diabetic to her knowledge.

## 2024-07-03 ENCOUNTER — APPOINTMENT (OUTPATIENT)
Dept: NEUROLOGY | Facility: CLINIC | Age: 69
End: 2024-07-03
Payer: MEDICARE

## 2024-07-03 PROCEDURE — 95911 NRV CNDJ TEST 9-10 STUDIES: CPT

## 2024-07-03 PROCEDURE — 95886 MUSC TEST DONE W/N TEST COMP: CPT

## 2024-07-08 ENCOUNTER — APPOINTMENT (OUTPATIENT)
Dept: NEUROLOGY | Facility: CLINIC | Age: 69
End: 2024-07-08
Payer: MEDICARE

## 2024-07-08 PROCEDURE — 95912 NRV CNDJ TEST 11-12 STUDIES: CPT

## 2024-07-08 PROCEDURE — 95886 MUSC TEST DONE W/N TEST COMP: CPT

## 2024-07-23 ENCOUNTER — APPOINTMENT (OUTPATIENT)
Dept: NEUROLOGY | Facility: CLINIC | Age: 69
End: 2024-07-23
Payer: MEDICARE

## 2024-07-23 VITALS — BODY MASS INDEX: 32.76 KG/M2 | WEIGHT: 178 LBS | HEIGHT: 62 IN

## 2024-07-23 DIAGNOSIS — G60.3 IDIOPATHIC PROGRESSIVE NEUROPATHY: ICD-10-CM

## 2024-07-23 PROCEDURE — 99214 OFFICE O/P EST MOD 30 MIN: CPT

## 2024-07-23 NOTE — HISTORY OF PRESENT ILLNESS
[de-identified] : Ms. Chelsea Gasca is a 68-year-old female presenting for a neurologic follow up. Her symptoms began about 2-3 years ago when she developed tingling in her left foot. She saw Dr. You at that time, and he did an EMG of the lower extremities which showed polyneuropathy. He did also order an MRI of the lumbar spine which showed minor narrowing at the L5-S1 level. She apparently never had a thorough neurological workup. She is also now experiencing tingling in her bilateral feet as well as upper extremities. She recently had a fall in December which led to her developing a fracture to her femur. She has since been using a cane to ambulate. She did have gastric bypass surgery more than 20 years ago, but the tingling only began in 2021 when she initially saw Dr. You. She is not a diabetic to her knowledge.   We performed EMGs which revealed polyneuropathy in the upper and lower extremities.

## 2024-07-23 NOTE — ASSESSMENT
[FreeTextEntry1] : Impression is that of polyneuropathy, etiology unknown. I ordered a metabolic blook work panel to evaluate for a cause as patient states she is not a known diabetic. We will see her back in follow up in 1 month for reevaluation.    Entered by Joyce Coronado acting as scribe for Dr. Vega.   The documentation recorded by the scribe, in my presence, accurately reflects the service I personally performed, and the decisions made by me with my edits as appropriate. Jose Vega MD, FAAN, FACP Diplomate American Board of Psychiatry & Neurology.

## 2024-07-23 NOTE — PHYSICAL EXAM
[de-identified] : NEUROLOGICAL EXAMINATION:  Mental Status: Patient is a good informant with intact orientation, attention, concentration, recent and remote memory. Language evaluation reveals no evidence of aphasia. Fund of knowledge is normal.  Cranial Nerves Cranial Nerves:  II, III, IV, VI: Pupils are equal, round, and reactive to light and accommodation. No evidence of afferent pupillary defect. Visual fields are full to confrontation. Eye movements are full without evidence of nystagmus or internuclear ophthalmoplegia. Funduscopic examination reveals sharp disc margins.  V: Normal jaw movements. Normal facial sensation.  VII: Normal facial motor testing.  VIII: Grossly normal hearing bilaterally.  IX, X: Palate moves symmetrically. No dysarthria.  XI: Normal shoulder shrug and sternocleidomastoid power.  XII: Tongue appears normal and protrudes in the midline.  Motor: Normal bulk, tone, and power throughout.  Muscle Stretch Reflexes (right/left): 2+ symmetrical. Knee jerks normal. Absent ankle jerks bilaterally. Vibration sensation intact in both lower extremities. Ambulation with a cane because of her left femur fracture. Romberg was performed and she wobbled but she did not fall. Tandem could not be done as she walks with a cane.

## 2024-07-26 ENCOUNTER — APPOINTMENT (OUTPATIENT)
Dept: ORTHOPEDIC SURGERY | Facility: CLINIC | Age: 69
End: 2024-07-26
Payer: MEDICARE

## 2024-07-26 DIAGNOSIS — S72.002A FRACTURE OF UNSPECIFIED PART OF NECK OF LEFT FEMUR, INITIAL ENCOUNTER FOR CLOSED FRACTURE: ICD-10-CM

## 2024-07-26 PROCEDURE — 99213 OFFICE O/P EST LOW 20 MIN: CPT

## 2024-07-26 PROCEDURE — 73552 X-RAY EXAM OF FEMUR 2/>: CPT | Mod: LT

## 2024-07-26 PROCEDURE — 73562 X-RAY EXAM OF KNEE 3: CPT | Mod: LT

## 2024-07-30 ENCOUNTER — APPOINTMENT (OUTPATIENT)
Dept: ORTHOPEDIC SURGERY | Facility: CLINIC | Age: 69
End: 2024-07-30
Payer: MEDICARE

## 2024-07-30 DIAGNOSIS — S72.002D FRACTURE OF UNSPECIFIED PART OF NECK OF LEFT FEMUR, SUBSEQUENT ENCOUNTER FOR CLOSED FRACTURE WITH ROUTINE HEALING: ICD-10-CM

## 2024-07-30 PROCEDURE — 73503 X-RAY EXAM HIP UNI 4/> VIEWS: CPT | Mod: LT

## 2024-07-30 NOTE — IMAGING
[de-identified] : Pleasant late middle-aged woman sits reasonably comfortably in my office.  Physical examination: Left hip: Mild tenderness palpation near the fracture without undue deformity.  No axilla lymph nodes.  Pain with range of motion of her hip.  Fracture appears to move his single unit.  Calf soft no cords.  Radiographs: Left hip (AP, lateral): Subtrochanteric femur fracture in slight varus with failure top of Seph medullary nail.

## 2024-07-30 NOTE — HISTORY OF PRESENT ILLNESS
[de-identified] : 68-year-old woman 7-month status post long cephalomedullary nailing left subtrochanteric nonunion with persisting complaints of leg length discrepancy now with increasing pain the left hip.  Seen in the walk-in clinic radiographs demonstrated failure Seph Nitronal.  Referred to my office for definitive management.  Patient denies any sensory complaints.  Pain about her back and as well as at the area of the fracture.  No history of fevers.  Has prior history of osteoporosis on Prolia currently.

## 2024-07-30 NOTE — ASSESSMENT
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  Recommend repair of nonunion with removal of broken implant and either fixation with new Seph medullary nail or blade plate.  Discussed recommendations and details of surgery with the patient.  Discussed risk benefits alternatives.  She is eager to proceed.  Will obtain a CT with metal subtraction prior to surgery.  Will try to bring the patient in the OR next week.  All questions answered to her satisfaction she agrees with the plan.

## 2024-08-08 ENCOUNTER — APPOINTMENT (OUTPATIENT)
Dept: ORTHOPEDIC SURGERY | Facility: CLINIC | Age: 69
End: 2024-08-08

## 2024-08-26 ENCOUNTER — APPOINTMENT (OUTPATIENT)
Dept: ORTHOPEDIC SURGERY | Facility: HOSPITAL | Age: 69
End: 2024-08-26

## 2024-08-27 ENCOUNTER — APPOINTMENT (OUTPATIENT)
Dept: NEUROLOGY | Facility: CLINIC | Age: 69
End: 2024-08-27
Payer: MEDICARE

## 2024-08-27 DIAGNOSIS — G57.93 UNSPECIFIED MONONEUROPATHY OF BILATERAL LOWER LIMBS: ICD-10-CM

## 2024-08-27 PROCEDURE — 99214 OFFICE O/P EST MOD 30 MIN: CPT

## 2024-08-27 NOTE — ASSESSMENT
[FreeTextEntry1] : Consequently, patient is being referred to [facility] neuromuscular department for treatment. We will send over her records for their review. Impression is that of progressive sensorimotor polyneuropathy.    Entered by Joyce Coronado acting as scribe for Dr. Vega.   The documentation recorded by the scribe, in my presence, accurately reflects the service I personally performed, and the decisions made by me with my edits as appropriate. Jose Vega MD, FAAN, FACP Diplomate American Board of Psychiatry & Neurology.

## 2024-08-27 NOTE — HISTORY OF PRESENT ILLNESS
[de-identified] : Ms. Chelsea Gasca is a 68-year-old female presenting for a neurologic follow up for progressive neuropathy. Her symptoms began about 2-3 years ago when she developed tingling in her left foot. She saw Dr. You at that time, and he did an EMG of the lower extremities which showed polyneuropathy. He did also order an MRI of the lumbar spine which showed minor narrowing at the L5-S1 level. She apparently never had a thorough neurological workup. She is also now experiencing tingling in her bilateral feet as well as upper extremities. She recently had a fall in December which led to her developing a fracture to her femur. She has since been using a cane to ambulate. She did have gastric bypass surgery more than 20 years ago, but the tingling only began in 2021 when she initially saw Dr. You. She is not a diabetic to her knowledge.   We performed EMGs which revealed polyneuropathy in the upper and lower extremities, worse compared to previous EMG studies in 2021. We did extensive metabolic blood work which was negative except for thyroid abnormalities which she is being treated for.

## 2024-08-27 NOTE — PHYSICAL EXAM
[de-identified] : NEUROLOGICAL EXAMINATION:  Mental Status: Patient is a good informant with intact orientation, attention, concentration, recent and remote memory. Language evaluation reveals no evidence of aphasia. Fund of knowledge is normal.  Cranial Nerves Cranial Nerves:  II, III, IV, VI: Pupils are equal, round, and reactive to light and accommodation. No evidence of afferent pupillary defect. Visual fields are full to confrontation. Eye movements are full without evidence of nystagmus or internuclear ophthalmoplegia. Funduscopic examination reveals sharp disc margins.  V: Normal jaw movements. Normal facial sensation.  VII: Normal facial motor testing.  VIII: Grossly normal hearing bilaterally.  IX, X: Palate moves symmetrically. No dysarthria.  XI: Normal shoulder shrug and sternocleidomastoid power.  XII: Tongue appears normal and protrudes in the midline.  Motor: Normal bulk, tone, and power throughout.  Muscle Stretch Reflexes (right/left): 2+ symmetrical. Knee jerks normal. Absent ankle jerks bilaterally. Vibration sensation intact in both lower extremities. Ambulation with a cane because of her left femur fracture. Romberg was performed and she wobbled but she did not fall. Tandem could not be done as she walks with a cane.

## 2024-09-13 ENCOUNTER — APPOINTMENT (OUTPATIENT)
Dept: ORTHOPEDIC SURGERY | Facility: CLINIC | Age: 69
End: 2024-09-13

## 2024-09-17 ENCOUNTER — APPOINTMENT (OUTPATIENT)
Dept: ORTHOPEDIC SURGERY | Facility: CLINIC | Age: 69
End: 2024-09-17

## 2024-09-20 NOTE — PATIENT PROFILE ADULT - HOW PATIENT ADDRESSED, PROFILE
Pt's sister Marycarmen calling regarding the instructions on the refill medication stating to take Monday Wednesday and Friday but stated that someone from our office told pt to take tablet daily.   She wants to confirm that there was no error with the refill instructions.   Please Advise      Chelsea

## 2024-10-14 NOTE — ASU PREOP CHECKLIST - NS PREOP CHK HIBICLENS NA
[IUD Removal] : intrauterine device (IUD) removal [Time out performed] : Pre-procedure time out performed.  Patient's name, date of birth and procedure confirmed. N/A [Consent Obtained] : Consent obtained [Risks] : risks [Benefits] : benefits [Alternatives] : alternatives [Patient] : patient [Speculum Placed] : speculum placed [Strings Visualized] : strings visualized [IUD Discarded] : IUD discarded [PRN] : as needed [de-identified] : Adverse side effects [General Vaginal Culture] : general vaginal culture [Tolerated Well] : the patient tolerated the procedure well [No Complications] : there were no complications #1:

## 2024-11-02 NOTE — ED ADULT TRIAGE NOTE - NS ED NURSE AMBULANCES
The patient's goals for the shift include      The clinical goals for the shift include remain HDS      Problem: Safety - Medical Restraint  Goal: Remains free of injury from restraints (Restraint for Interference with Medical Device)  Outcome: Progressing     Problem: Safety - Medical Restraint  Goal: Free from restraint(s) (Restraint for Interference with Medical Device)  Outcome: Progressing        [employer]

## 2025-04-01 NOTE — INPATIENT CERTIFICATION FOR MEDICARE PATIENTS - IN ORDER TO MEET MEDICARE REQUIREMENTS.
In order to meet Medicare requirements, the clinical documentation must support the information cited in the admission order.  Please be sure to provide detailed and clear documentation about the following in the admitting note/history and physical:
Apixaban/Eliquis is used to treat and prevent blood clots. If you are not able to swallow the tablets whole, they may be crushed and mixed in water, apple juice, or applesauce and promptly taken within four hours. Never skip a dose of Apixaban/Eliquis. If you forget to take your Apixaban/Eliquis, take a dose as soon as you remember. If it is almost time for your next Apixaban/Eliquis dose, wait until then and take a regular dose. DO NOT take an extra pill to ‘catch up’.  NEVER TAKE A DOUBLE DOSE. Notify your doctor that you missed a dose. Take Apixaban/Eliquis at the same time each morning and evening. Apixaban/Eliquis may be taken with other medication or food.